# Patient Record
Sex: FEMALE | Race: WHITE | Employment: FULL TIME | URBAN - METROPOLITAN AREA
[De-identification: names, ages, dates, MRNs, and addresses within clinical notes are randomized per-mention and may not be internally consistent; named-entity substitution may affect disease eponyms.]

---

## 2022-03-11 ENCOUNTER — OFFICE VISIT (OUTPATIENT)
Dept: FAMILY MEDICINE CLINIC | Age: 22
End: 2022-03-11
Payer: COMMERCIAL

## 2022-03-11 VITALS
HEIGHT: 67 IN | SYSTOLIC BLOOD PRESSURE: 112 MMHG | OXYGEN SATURATION: 98 % | BODY MASS INDEX: 45.04 KG/M2 | DIASTOLIC BLOOD PRESSURE: 70 MMHG | HEART RATE: 84 BPM | WEIGHT: 287 LBS | TEMPERATURE: 97.8 F

## 2022-03-11 DIAGNOSIS — Z76.89 ENCOUNTER TO ESTABLISH CARE: ICD-10-CM

## 2022-03-11 DIAGNOSIS — F41.9 ANXIETY: ICD-10-CM

## 2022-03-11 DIAGNOSIS — Z76.89 ENCOUNTER TO ESTABLISH CARE: Primary | ICD-10-CM

## 2022-03-11 DIAGNOSIS — K76.0 FATTY LIVER: ICD-10-CM

## 2022-03-11 DIAGNOSIS — J45.20 MILD INTERMITTENT ASTHMA WITHOUT COMPLICATION: ICD-10-CM

## 2022-03-11 LAB
A/G RATIO: 2 (ref 1.1–2.2)
ALBUMIN SERPL-MCNC: 4.8 G/DL (ref 3.4–5)
ALP BLD-CCNC: 58 U/L (ref 40–129)
ALT SERPL-CCNC: 51 U/L (ref 10–40)
ANION GAP SERPL CALCULATED.3IONS-SCNC: 22 MMOL/L (ref 3–16)
AST SERPL-CCNC: 56 U/L (ref 15–37)
BILIRUB SERPL-MCNC: 0.5 MG/DL (ref 0–1)
BUN BLDV-MCNC: 8 MG/DL (ref 7–20)
CALCIUM SERPL-MCNC: 10 MG/DL (ref 8.3–10.6)
CHLORIDE BLD-SCNC: 102 MMOL/L (ref 99–110)
CHOLESTEROL, TOTAL: 188 MG/DL (ref 0–199)
CO2: 18 MMOL/L (ref 21–32)
CREAT SERPL-MCNC: 0.8 MG/DL (ref 0.6–1.1)
GFR AFRICAN AMERICAN: >60
GFR NON-AFRICAN AMERICAN: >60
GLUCOSE BLD-MCNC: 87 MG/DL (ref 70–99)
HCT VFR BLD CALC: 39.3 % (ref 36–48)
HDLC SERPL-MCNC: 29 MG/DL (ref 40–60)
HEMOGLOBIN: 13.5 G/DL (ref 12–16)
LDL CHOLESTEROL CALCULATED: 140 MG/DL
MCH RBC QN AUTO: 30 PG (ref 26–34)
MCHC RBC AUTO-ENTMCNC: 34.4 G/DL (ref 31–36)
MCV RBC AUTO: 87 FL (ref 80–100)
PDW BLD-RTO: 13.1 % (ref 12.4–15.4)
PLATELET # BLD: 290 K/UL (ref 135–450)
PMV BLD AUTO: 9.4 FL (ref 5–10.5)
POTASSIUM SERPL-SCNC: 4.1 MMOL/L (ref 3.5–5.1)
RBC # BLD: 4.52 M/UL (ref 4–5.2)
SODIUM BLD-SCNC: 142 MMOL/L (ref 136–145)
TOTAL PROTEIN: 7.2 G/DL (ref 6.4–8.2)
TRIGL SERPL-MCNC: 97 MG/DL (ref 0–150)
VLDLC SERPL CALC-MCNC: 19 MG/DL
WBC # BLD: 4.5 K/UL (ref 4–11)

## 2022-03-11 PROCEDURE — 99204 OFFICE O/P NEW MOD 45 MIN: CPT | Performed by: PHYSICIAN ASSISTANT

## 2022-03-11 RX ORDER — HYDROXYZINE HYDROCHLORIDE 25 MG/1
25 TABLET, FILM COATED ORAL 3 TIMES DAILY PRN
Qty: 90 TABLET | Refills: 0 | Status: SHIPPED | OUTPATIENT
Start: 2022-03-11 | End: 2022-04-10

## 2022-03-11 RX ORDER — ALBUTEROL SULFATE 90 UG/1
2 AEROSOL, METERED RESPIRATORY (INHALATION) 4 TIMES DAILY PRN
Qty: 18 G | Refills: 5 | Status: SHIPPED | OUTPATIENT
Start: 2022-03-11

## 2022-03-11 RX ORDER — FLUOXETINE 10 MG/1
10 CAPSULE ORAL DAILY
Qty: 30 CAPSULE | Refills: 3 | Status: SHIPPED | OUTPATIENT
Start: 2022-03-11

## 2022-03-11 SDOH — ECONOMIC STABILITY: HOUSING INSECURITY
IN THE LAST 12 MONTHS, WAS THERE A TIME WHEN YOU DID NOT HAVE A STEADY PLACE TO SLEEP OR SLEPT IN A SHELTER (INCLUDING NOW)?: NO

## 2022-03-11 SDOH — ECONOMIC STABILITY: HOUSING INSECURITY: IN THE LAST 12 MONTHS, HOW MANY PLACES HAVE YOU LIVED?: 1

## 2022-03-11 SDOH — ECONOMIC STABILITY: TRANSPORTATION INSECURITY
IN THE PAST 12 MONTHS, HAS LACK OF TRANSPORTATION KEPT YOU FROM MEETINGS, WORK, OR FROM GETTING THINGS NEEDED FOR DAILY LIVING?: NO

## 2022-03-11 SDOH — ECONOMIC STABILITY: FOOD INSECURITY: WITHIN THE PAST 12 MONTHS, THE FOOD YOU BOUGHT JUST DIDN'T LAST AND YOU DIDN'T HAVE MONEY TO GET MORE.: NEVER TRUE

## 2022-03-11 SDOH — ECONOMIC STABILITY: INCOME INSECURITY: IN THE LAST 12 MONTHS, WAS THERE A TIME WHEN YOU WERE NOT ABLE TO PAY THE MORTGAGE OR RENT ON TIME?: NO

## 2022-03-11 SDOH — ECONOMIC STABILITY: TRANSPORTATION INSECURITY
IN THE PAST 12 MONTHS, HAS THE LACK OF TRANSPORTATION KEPT YOU FROM MEDICAL APPOINTMENTS OR FROM GETTING MEDICATIONS?: NO

## 2022-03-11 SDOH — ECONOMIC STABILITY: FOOD INSECURITY: WITHIN THE PAST 12 MONTHS, YOU WORRIED THAT YOUR FOOD WOULD RUN OUT BEFORE YOU GOT MONEY TO BUY MORE.: NEVER TRUE

## 2022-03-11 ASSESSMENT — ENCOUNTER SYMPTOMS
ABDOMINAL PAIN: 0
SHORTNESS OF BREATH: 0
RHINORRHEA: 0
CONSTIPATION: 0
DIARRHEA: 0
NAUSEA: 0
SORE THROAT: 0
VOMITING: 0
COUGH: 0

## 2022-03-11 ASSESSMENT — PATIENT HEALTH QUESTIONNAIRE - PHQ9
SUM OF ALL RESPONSES TO PHQ QUESTIONS 1-9: 0
SUM OF ALL RESPONSES TO PHQ QUESTIONS 1-9: 0
1. LITTLE INTEREST OR PLEASURE IN DOING THINGS: 0
SUM OF ALL RESPONSES TO PHQ QUESTIONS 1-9: 0
SUM OF ALL RESPONSES TO PHQ QUESTIONS 1-9: 0
SUM OF ALL RESPONSES TO PHQ9 QUESTIONS 1 & 2: 0
2. FEELING DOWN, DEPRESSED OR HOPELESS: 0

## 2022-03-11 ASSESSMENT — SOCIAL DETERMINANTS OF HEALTH (SDOH): HOW HARD IS IT FOR YOU TO PAY FOR THE VERY BASICS LIKE FOOD, HOUSING, MEDICAL CARE, AND HEATING?: NOT HARD AT ALL

## 2022-03-11 NOTE — PROGRESS NOTES
3/11/2022  Emely Harvey (: 2000)  25 y.o. HPI     Patient presents to establish care. Has not had PCP since pediatrician. History of anxiety/depression/bipolar disorder. Last on medication in 2018. Has been on lexapro, zoloft, prozac in the past. Prozac worked well, also on lithium and atarax at that time. Has not been on anything for the past few years. Feels she has been coping decently, work is going well, has a good support system with family and friends. Sleeping ok, normal appetite. Does have increased anxiety, usually daily. History of fatty liver disease, per chart review diagnosed at 5 yo. Does have family history of fatty liver disease on dad's side. Persistently elevated liver enzymes. None recently. Denies abdominal pain, diarrhea, constipation. MRI liver 2018  Impression  Performed by [facility]  1.  No anatomic abnormalities in the liver or the remainder of the imaged abdomen. 2.  Normal mean liver stiffness of 2.5 kPa (range 2.4 to 2.7 kPa). 3.  Elevated hepatic fat fraction of 35.8% determined by mDixon technique (NOTE: comparisons of fat   fractions determined by different techniques may not be accurate). 4.  Normal quantitative estimate of liver iron content 1084 mcg/gram dry weight. 5.  Liver volume = 2838 mL. History of asthma as a child. Tends to flare with exercise or when she is sick. Uses albuterol inhalers prn. Exercising a few times a week. No particular diet   Works for [employer], enjoys her job     Not currently sexually active, has been sexually active in the past. Has not had pap. Review of Systems   Constitutional: Negative for activity change, chills and fever. HENT: Negative for congestion, ear pain, rhinorrhea and sore throat. Eyes: Negative for visual disturbance. Respiratory: Negative for cough and shortness of breath. Cardiovascular: Negative for chest pain and palpitations.    Gastrointestinal: Negative for abdominal pain, constipation, diarrhea, nausea and vomiting. Genitourinary: Negative for difficulty urinating and dysuria. Musculoskeletal: Negative for arthralgias and myalgias. Skin: Negative for rash. Neurological: Negative for dizziness, weakness and numbness. Psychiatric/Behavioral: Positive for dysphoric mood. Negative for sleep disturbance. The patient is nervous/anxious. Past Medical History:   Diagnosis Date    Anxiety     Bipolar disorder ([address]     Depression     Prediabetes      History reviewed. No pertinent surgical history. Family History   Problem Relation Age of Onset    Breast Cancer Mother      Social History     Socioeconomic History    Marital status: Single     Spouse name: Not on file    Number of children: Not on file    Years of education: Not on file    Highest education level: Not on file   Occupational History    Not on file   Tobacco Use    Smoking status: Never Smoker    Smokeless tobacco: Never Used   Vaping Use    Vaping Use: Every day   Substance and Sexual Activity    Alcohol use: Never    Drug use: Never    Sexual activity: Not on file   Other Topics Concern    Not on file   Social History Narrative    Not on file     Social Determinants of Health     Financial Resource Strain: Low Risk     Difficulty of Paying Living Expenses: Not hard at all   Food Insecurity: No Food Insecurity    Worried About Running Out of Food in the Last Year: Never true    [address] in the Last Year: Never true   Transportation Needs: No Transportation Needs    Lack of Transportation (Medical): No    Lack of Transportation (Non-Medical):  No   Physical Activity:     Days of Exercise per Week: Not on file    Minutes of Exercise per Session: Not on file   Stress:     Feeling of Stress : Not on file   Social Connections:     Frequency of Communication with Friends and Family: Not on file    Frequency of Social Gatherings with Friends and Family: Not on file    Attends Cheondoism Services: Not on file    Active Member of Clubs or Organizations: Not on file    Attends Club or Organization Meetings: Not on file    Marital Status: Not on file   Intimate Partner Violence:     Fear of Current or Ex-Partner: Not on file    Emotionally Abused: Not on file    Physically Abused: Not on file    Sexually Abused: Not on file   Housing Stability: [address] Unable to Pay for Housing in the Last Year: No    Number of [place] in the Last Year: 1    Unstable Housing in the Last Year: No     No Known Allergies    Current Outpatient Medications   Medication Sig Dispense Refill    albuterol sulfate HFA (VENTOLIN HFA) 108 (90 Base) MCG/ACT inhaler Inhale 2 puffs into the lungs 4 times daily as needed for Wheezing 18 g 5    FLUoxetine (PROZAC) 10 MG capsule Take 1 capsule by mouth daily 30 capsule 3    hydrOXYzine (ATARAX) 25 MG tablet Take 1 tablet by mouth 3 times daily as needed for Anxiety 90 tablet 0     No current facility-administered medications for this visit. Vitals:    03/11/22 0914   BP: 112/70   Site: Right Upper Arm   Position: Sitting   Cuff Size: Large Adult   Pulse: 84   Temp: 97.8 °F (36.6 °C)   TempSrc: Oral   SpO2: 98%   Weight: 287 lb (130.2 kg)   Height: 5' 7\" (1.702 m)     Estimated body mass index is 44.95 kg/m² as calculated from the following:    Height as of this encounter: 5' 7\" (1.702 m). Weight as of this encounter: 287 lb (130.2 kg). Physical Exam  Constitutional:       General: She is not in acute distress. Appearance: She is well-developed. She is obese. HENT:      Head: Normocephalic and atraumatic. Eyes:      Conjunctiva/sclera: Conjunctivae normal.      Pupils: Pupils are equal, round, and reactive to light. Cardiovascular:      Rate and Rhythm: Normal rate and regular rhythm. Heart sounds: Normal heart sounds. No murmur heard.       Pulmonary:      Effort: Pulmonary effort is normal.      Breath sounds: Normal breath sounds. No wheezing. Abdominal:      General: Bowel sounds are normal.      Palpations: Abdomen is soft. Tenderness: There is no abdominal tenderness. Musculoskeletal:      Cervical back: Neck supple. Comments:     Lymphadenopathy:      Cervical: No cervical adenopathy. Skin:     General: Skin is warm and dry. Findings: No rash. Neurological:      Mental Status: She is alert and oriented to person, place, and time. ASSESSMENT and PLAN:  Anthony Bynum was seen today for new patient. Diagnoses and all orders for this visit:    Encounter to establish care  -     CBC; Future  -     Comprehensive Metabolic Panel; Future  -     LIPID PANEL; Future  -     Hemoglobin A1C; Future  - update labs today  - patient to return for pap and std screen    Fatty liver  -     US LIVER SPLEEN; Future  - update labs today     Mild intermittent asthma without complication  -     albuterol sulfate HFA (VENTOLIN HFA) 108 (90 Base) MCG/ACT inhaler; Inhale 2 puffs into the lungs 4 times daily as needed for Wheezing    Anxiety  -     FLUoxetine (PROZAC) 10 MG capsule; Take 1 capsule by mouth daily  -     hydrOXYzine (ATARAX) 25 MG tablet; Take 1 tablet by mouth 3 times daily as needed for Anxiety  - start prozac, low dose given h/o liver disease. Encouraged to monitor for sxs of rohan.   - I've explained to her that drugs of the SSRI class can have side effects such as weight gain, sexual dysfunction, insomnia, headache, nausea. These medications are generally effective at alleviating symptoms of anxiety and/or depression. Let me know if significant side effects do occur. Return in about 6 weeks (around 4/22/2022) for Anxiety.

## 2022-03-12 LAB
ESTIMATED AVERAGE GLUCOSE: 88.2 MG/DL
HBA1C MFR BLD: 4.7 %

## 2022-03-28 ENCOUNTER — HOSPITAL ENCOUNTER (OUTPATIENT)
Dept: ULTRASOUND IMAGING | Age: 22
Discharge: HOME | End: 2022-03-28
Payer: COMMERCIAL

## 2022-03-28 DIAGNOSIS — K76.0 FATTY LIVER: ICD-10-CM

## 2022-03-28 PROCEDURE — 76705 ECHO EXAM OF ABDOMEN: CPT

## 2022-04-05 DIAGNOSIS — F41.9 ANXIETY: ICD-10-CM

## 2022-04-05 RX ORDER — HYDROXYZINE HYDROCHLORIDE 25 MG/1
TABLET, FILM COATED ORAL
Qty: 90 TABLET | Refills: 0 | OUTPATIENT
Start: 2022-04-05

## 2022-04-05 RX ORDER — FLUOXETINE 10 MG/1
10 CAPSULE ORAL DAILY
Qty: 30 CAPSULE | Refills: 3 | OUTPATIENT
Start: 2022-04-05

## 2022-04-05 NOTE — TELEPHONE ENCOUNTER
Refill Request     Last Seen: Last Seen Department: 3/11/2022  Last Seen by PCP: 3/11/2022    Last Written: 3/11/2022    Next Appointment:   Future Appointments   Date Time Provider Pavan Espino   4/22/2022 10:30 AM T[facility] [place] - [facility]       Future appointment scheduled      Requested Prescriptions     Pending Prescriptions Disp Refills    hydrOXYzine (ATARAX) 25 MG tablet [Pharmacy Med Name: HYDROXYZINE HCL 25 MG TABLET] 90 tablet 0     Sig: TAKE 1 TABLET BY MOUTH THREE TIMES A DAY AS NEEDED FOR ANXIETY

## 2022-04-23 ENCOUNTER — APPOINTMENT (OUTPATIENT)
Dept: CT IMAGING | Age: 22
End: 2022-04-23
Payer: COMMERCIAL

## 2022-04-23 ENCOUNTER — HOSPITAL ENCOUNTER (EMERGENCY)
Age: 22
Discharge: HOME | End: 2022-04-23
Attending: STUDENT IN AN ORGANIZED HEALTH CARE EDUCATION/TRAINING PROGRAM
Payer: COMMERCIAL

## 2022-04-23 VITALS
HEART RATE: 72 BPM | DIASTOLIC BLOOD PRESSURE: 71 MMHG | BODY MASS INDEX: 46.12 KG/M2 | RESPIRATION RATE: 16 BRPM | HEIGHT: 66 IN | TEMPERATURE: 99 F | SYSTOLIC BLOOD PRESSURE: 114 MMHG | OXYGEN SATURATION: 100 % | WEIGHT: 287 LBS

## 2022-04-23 DIAGNOSIS — L05.01 PILONIDAL CYST WITH ABSCESS: Primary | ICD-10-CM

## 2022-04-23 LAB
A/G RATIO: 1.7 (ref 1.1–2.2)
ALBUMIN SERPL-MCNC: 4.6 G/DL (ref 3.4–5)
ALP BLD-CCNC: 67 U/L (ref 40–129)
ALT SERPL-CCNC: 17 U/L (ref 10–40)
AMORPHOUS: ABNORMAL /HPF
ANION GAP SERPL CALCULATED.3IONS-SCNC: 11 MMOL/L (ref 3–16)
AST SERPL-CCNC: 17 U/L (ref 15–37)
BACTERIA: ABNORMAL /HPF
BASOPHILS ABSOLUTE: 0 K/UL (ref 0–0.2)
BASOPHILS RELATIVE PERCENT: 0.5 %
BILIRUB SERPL-MCNC: 0.7 MG/DL (ref 0–1)
BILIRUBIN URINE: NEGATIVE
BLOOD, URINE: ABNORMAL
BUN BLDV-MCNC: 10 MG/DL (ref 7–20)
CALCIUM SERPL-MCNC: 9.4 MG/DL (ref 8.3–10.6)
CHLORIDE BLD-SCNC: 101 MMOL/L (ref 99–110)
CLARITY: CLEAR
CO2: 25 MMOL/L (ref 21–32)
COLOR: YELLOW
CREAT SERPL-MCNC: 1.1 MG/DL (ref 0.6–1.1)
EOSINOPHILS ABSOLUTE: 0.2 K/UL (ref 0–0.6)
EOSINOPHILS RELATIVE PERCENT: 2.9 %
EPITHELIAL CELLS, UA: ABNORMAL /HPF (ref 0–5)
GFR AFRICAN AMERICAN: >60
GFR NON-AFRICAN AMERICAN: >60
GLUCOSE BLD-MCNC: 90 MG/DL (ref 70–99)
GLUCOSE URINE: NEGATIVE MG/DL
HCG QUALITATIVE: NEGATIVE
HCT VFR BLD CALC: 36.4 % (ref 36–48)
HEMOGLOBIN: 12.6 G/DL (ref 12–16)
KETONES, URINE: NEGATIVE MG/DL
LACTIC ACID, SEPSIS: 0.9 MMOL/L (ref 0.4–1.9)
LEUKOCYTE ESTERASE, URINE: ABNORMAL
LYMPHOCYTES ABSOLUTE: 1.9 K/UL (ref 1–5.1)
LYMPHOCYTES RELATIVE PERCENT: 27.6 %
MCH RBC QN AUTO: 30.3 PG (ref 26–34)
MCHC RBC AUTO-ENTMCNC: 34.8 G/DL (ref 31–36)
MCV RBC AUTO: 87 FL (ref 80–100)
MICROSCOPIC EXAMINATION: YES
MONOCYTES ABSOLUTE: 0.6 K/UL (ref 0–1.3)
MONOCYTES RELATIVE PERCENT: 8.7 %
MUCUS: ABNORMAL /LPF
NEUTROPHILS ABSOLUTE: 4.1 K/UL (ref 1.7–7.7)
NEUTROPHILS RELATIVE PERCENT: 60.3 %
NITRITE, URINE: NEGATIVE
PDW BLD-RTO: 12.9 % (ref 12.4–15.4)
PH UA: 5.5 (ref 5–8)
PLATELET # BLD: 262 K/UL (ref 135–450)
PMV BLD AUTO: 8.7 FL (ref 5–10.5)
POTASSIUM REFLEX MAGNESIUM: 4 MMOL/L (ref 3.5–5.1)
PROTEIN UA: NEGATIVE MG/DL
RBC # BLD: 4.18 M/UL (ref 4–5.2)
RBC UA: ABNORMAL /HPF (ref 0–4)
RENAL EPITHELIAL, UA: ABNORMAL /HPF (ref 0–1)
SODIUM BLD-SCNC: 137 MMOL/L (ref 136–145)
SPECIFIC GRAVITY UA: 1.02 (ref 1–1.03)
TOTAL PROTEIN: 7.3 G/DL (ref 6.4–8.2)
URINE TYPE: ABNORMAL
UROBILINOGEN, URINE: 0.2 E.U./DL
WBC # BLD: 6.8 K/UL (ref 4–11)
WBC UA: ABNORMAL /HPF (ref 0–5)

## 2022-04-23 PROCEDURE — 85025 COMPLETE CBC W/AUTO DIFF WBC: CPT

## 2022-04-23 PROCEDURE — 99285 EMERGENCY DEPT VISIT HI MDM: CPT

## 2022-04-23 PROCEDURE — 96375 TX/PRO/DX INJ NEW DRUG ADDON: CPT

## 2022-04-23 PROCEDURE — 70450 CT HEAD/BRAIN W/O DYE: CPT

## 2022-04-23 PROCEDURE — 6360000004 HC RX CONTRAST MEDICATION: Performed by: STUDENT IN AN ORGANIZED HEALTH CARE EDUCATION/TRAINING PROGRAM

## 2022-04-23 PROCEDURE — 87040 BLOOD CULTURE FOR BACTERIA: CPT

## 2022-04-23 PROCEDURE — 6360000002 HC RX W HCPCS: Performed by: STUDENT IN AN ORGANIZED HEALTH CARE EDUCATION/TRAINING PROGRAM

## 2022-04-23 PROCEDURE — 80053 COMPREHEN METABOLIC PANEL: CPT

## 2022-04-23 PROCEDURE — 96374 THER/PROPH/DIAG INJ IV PUSH: CPT

## 2022-04-23 PROCEDURE — 72193 CT PELVIS W/DYE: CPT

## 2022-04-23 PROCEDURE — 84703 CHORIONIC GONADOTROPIN ASSAY: CPT

## 2022-04-23 PROCEDURE — 83605 ASSAY OF LACTIC ACID: CPT

## 2022-04-23 PROCEDURE — 2580000003 HC RX 258: Performed by: STUDENT IN AN ORGANIZED HEALTH CARE EDUCATION/TRAINING PROGRAM

## 2022-04-23 PROCEDURE — 81001 URINALYSIS AUTO W/SCOPE: CPT

## 2022-04-23 RX ORDER — METOCLOPRAMIDE HYDROCHLORIDE 5 MG/ML
10 INJECTION INTRAMUSCULAR; INTRAVENOUS ONCE
Status: COMPLETED | OUTPATIENT
Start: 2022-04-23 | End: 2022-04-23

## 2022-04-23 RX ORDER — DIPHENHYDRAMINE HYDROCHLORIDE 50 MG/ML
25 INJECTION INTRAMUSCULAR; INTRAVENOUS ONCE
Status: COMPLETED | OUTPATIENT
Start: 2022-04-23 | End: 2022-04-23

## 2022-04-23 RX ORDER — KETOROLAC TROMETHAMINE 30 MG/ML
15 INJECTION, SOLUTION INTRAMUSCULAR; INTRAVENOUS ONCE
Status: COMPLETED | OUTPATIENT
Start: 2022-04-23 | End: 2022-04-23

## 2022-04-23 RX ORDER — 0.9 % SODIUM CHLORIDE 0.9 %
1000 INTRAVENOUS SOLUTION INTRAVENOUS ONCE
Status: COMPLETED | OUTPATIENT
Start: 2022-04-23 | End: 2022-04-23

## 2022-04-23 RX ORDER — SULFAMETHOXAZOLE AND TRIMETHOPRIM 800; 160 MG/1; MG/1
1 TABLET ORAL 2 TIMES DAILY
COMMUNITY

## 2022-04-23 RX ADMIN — METOCLOPRAMIDE HYDROCHLORIDE 10 MG: 5 INJECTION INTRAMUSCULAR; INTRAVENOUS at 15:26

## 2022-04-23 RX ADMIN — SODIUM CHLORIDE 1000 ML: 9 INJECTION, SOLUTION INTRAVENOUS at 15:25

## 2022-04-23 RX ADMIN — DIPHENHYDRAMINE HYDROCHLORIDE 25 MG: 50 INJECTION, SOLUTION INTRAMUSCULAR; INTRAVENOUS at 15:27

## 2022-04-23 RX ADMIN — KETOROLAC TROMETHAMINE 15 MG: 30 INJECTION, SOLUTION INTRAMUSCULAR at 15:26

## 2022-04-23 RX ADMIN — IOPAMIDOL 75 ML: 755 INJECTION, SOLUTION INTRAVENOUS at 16:24

## 2022-04-23 ASSESSMENT — PAIN DESCRIPTION - DESCRIPTORS
DESCRIPTORS: ACHING
DESCRIPTORS: ACHING

## 2022-04-23 ASSESSMENT — PAIN DESCRIPTION - FREQUENCY: FREQUENCY: CONTINUOUS

## 2022-04-23 ASSESSMENT — PAIN DESCRIPTION - LOCATION
LOCATION: SACRUM
LOCATION: SACRUM

## 2022-04-23 ASSESSMENT — PAIN SCALES - GENERAL
PAINLEVEL_OUTOF10: 2
PAINLEVEL_OUTOF10: 9
PAINLEVEL_OUTOF10: 9
PAINLEVEL_OUTOF10: 0
PAINLEVEL_OUTOF10: 2

## 2022-04-23 ASSESSMENT — PAIN - FUNCTIONAL ASSESSMENT: PAIN_FUNCTIONAL_ASSESSMENT: 0-10

## 2022-04-23 NOTE — ED PROVIDER NOTES
[address] [facility]  ED      CHIEF COMPLAINT  Cyst (cyst on tailbone. Has been there for 3-4 weeks. Has seen PCP, had area drained and has been on antibiotics. Presents today, \"because no better. \")       HISTORY OF PRESENT ILLNESS  Nickolas Eisenberg is a 25 y.o. female  who presents to the ED complaining of recurrent pilonidal abscess. Patient states she first noticed symptoms 2 weeks ago. Went to urgent care 9 days ago and had it drained and started on Keflex. It did not improve and became larger so she went back to urgent care 2 days ago. At this time she states that they changed antibiotics to Bactrim, poked a hole in the abscess and sent a culture. She was advised to come here today because over the last week she has been having intermittent chills as well as headaches. Patient indicates the headache is primarily in the posterior aspect of her head, worse with movement. She does have a headache currently. She brings a paper report of the culture which is growing Corynebacterium, which is resistant. This is noted to be a common skin nelly. No other complaints, modifying factors or associated symptoms. I have reviewed the following from the nursing documentation. Past Medical History:   Diagnosis Date    Anxiety     Bipolar disorder ([facility])     Depression     Prediabetes      History reviewed. No pertinent surgical history.   Family History   Problem Relation Age of Onset    Breast Cancer Mother      Social History     Socioeconomic History    Marital status: Single     Spouse name: Not on file    Number of children: Not on file    Years of education: Not on file    Highest education level: Not on file   Occupational History    Not on file   Tobacco Use    Smoking status: Never Smoker    Smokeless tobacco: Never Used   Vaping Use    Vaping Use: Every day   Substance and Sexual Activity    Alcohol use: Never    Drug use: Never    Sexual activity: Not on file   Other Topics Concern    Not on file   Social History Narrative    Not on file     Social Determinants of Health     Financial Resource Strain: Low Risk     Difficulty of Paying Living Expenses: Not hard at all   Food Insecurity: No Food Insecurity    Worried About Running Out of Food in the Last Year: Never true    [address] in the Last Year: Never true   Transportation Needs: No Transportation Needs    Lack of Transportation (Medical): No    Lack of Transportation (Non-Medical):  No   Physical Activity:     Days of Exercise per Week: Not on file    Minutes of Exercise per Session: Not on file   Stress:     Feeling of Stress : Not on file   Social Connections:     Frequency of Communication with Friends and Family: Not on file    Frequency of Social Gatherings with Friends and Family: Not on file    Attends Adventism Services: Not on file    Active Member of [address] [place] [address] or Organizations: Not on file    Attends Club or Organization Meetings: Not on file    Marital Status: Not on file   Intimate Partner Violence:     Fear of Current or Ex-Partner: Not on file    Emotionally Abused: Not on file    Physically Abused: Not on file    Sexually Abused: Not on file   Housing Stability: [facility] [address] Unable to Pay for Housing in the Last Year: No    Number of [place] in the Last Year: 1    Unstable Housing in the Last Year: No     Current Facility-Administered Medications   Medication Dose Route Frequency Provider Last Rate Last Admin    0.9 % sodium chloride bolus  1,000 mL IntraVENous Once Waylan Mortimer, DO        metoclopramide (REGLAN) injection 10 mg  10 mg IntraVENous Once Waylan Mortimer, DO        diphenhydrAMINE (BENADRYL) injection 25 mg  25 mg IntraVENous Once Waylan Mortimer, DO        ketorolac (TORADOL) injection 15 mg  15 mg IntraVENous Once Waylan Mortimer, DO         Current Outpatient Medications   Medication Sig Dispense Refill    sulfamethoxazole-trimethoprim (BACTRIM DS;SEPTRA DS) 800-160 MG per tablet Take 1 tablet by mouth 2 times daily      albuterol sulfate HFA (VENTOLIN HFA) 108 (90 Base) MCG/ACT inhaler Inhale 2 puffs into the lungs 4 times daily as needed for Wheezing 18 g 5    FLUoxetine (PROZAC) 10 MG capsule Take 1 capsule by mouth daily (Patient not taking: Reported on 4/23/2022) 30 capsule 3     No Known Allergies    REVIEW OF SYSTEMS  10 systems reviewed, pertinent positives per HPI otherwise noted to be negative. PHYSICAL EXAM  BP (!) 117/98   Pulse 95   Temp 99 °F (37.2 °C) (Oral)   Resp 16   Ht 5' 6\" (1.676 m)   Wt 287 lb (130.2 kg)   LMP 04/11/2022 (Approximate)   SpO2 99%   BMI 46.32 kg/m²    General: Appears well. Alert  HEENT: Head atraumatic, Eyes normal inspection, PERRL. Normal ENT inspection, Pharynx normal. No signs of dehydration  NECK: Normal inspection, no meningismus  RESPIRATORY: Normal breath sounds. No chest wall tenderness. No respiratory distress  CVS: Heart rate and rhythm regular. No Murmurs  ABDOMEN/GI: Soft, Non-tender, No distention  BACK: Approximately 2 cm pilonidal abscess with surrounding induration and erythema. EXTREMITIES: Non-Tender. Full ROM. Normal appearance. No Pedal edema  NEURO: Alert and oriented. Sensation normal. Motor normal  PSYCH: Mood normal. Affect normal.  SKIN: Color normal. No rash. Warm, Dry    LABS  I have reviewed all labs for this visit.    Results for orders placed or performed during the hospital encounter of 04/23/22   CBC with Auto Differential   Result Value Ref Range    WBC 6.8 4.0 - 11.0 K/uL    RBC 4.18 4.00 - 5.20 M/uL    Hemoglobin 12.6 12.0 - 16.0 g/dL    Hematocrit 36.4 36.0 - 48.0 %    MCV 87.0 80.0 - 100.0 fL    MCH 30.3 26.0 - 34.0 pg    MCHC 34.8 31.0 - 36.0 g/dL    RDW 12.9 12.4 - 15.4 %    Platelets 813 666 - 885 K/uL    MPV 8.7 5.0 - 10.5 fL    Neutrophils % 60.3 %    Lymphocytes % 27.6 %    Monocytes % 8.7 %    Eosinophils % 2.9 %    Basophils % 0.5 %    Neutrophils Absolute 4.1 1.7 - 7.7 K/uL    Lymphocytes Absolute 1.9 1.0 - 5.1 K/uL    Monocytes Absolute 0.6 0.0 - 1.3 K/uL    Eosinophils Absolute 0.2 0.0 - 0.6 K/uL    Basophils Absolute 0.0 0.0 - 0.2 K/uL   Comprehensive Metabolic Panel w/ Reflex to MG   Result Value Ref Range    Sodium 137 136 - 145 mmol/L    Potassium reflex Magnesium 4.0 3.5 - 5.1 mmol/L    Chloride 101 99 - 110 mmol/L    CO2 25 21 - 32 mmol/L    Anion Gap 11 3 - 16    Glucose 90 70 - 99 mg/dL    BUN 10 7 - 20 mg/dL    CREATININE 1.1 0.6 - 1.1 mg/dL    GFR Non-African American >60 >60    GFR African American >60 >60    Calcium 9.4 8.3 - 10.6 mg/dL    Total Protein 7.3 6.4 - 8.2 g/dL    Albumin 4.6 3.4 - 5.0 g/dL    Albumin/Globulin Ratio 1.7 1.1 - 2.2    Total Bilirubin 0.7 0.0 - 1.0 mg/dL    Alkaline Phosphatase 67 40 - 129 U/L    ALT 17 10 - 40 U/L    AST 17 15 - 37 U/L   Lactate, Sepsis   Result Value Ref Range    Lactic Acid, Sepsis 0.9 0.4 - 1.9 mmol/L   Urinalysis   Result Value Ref Range    Color, UA Yellow Straw/Yellow    Clarity, UA Clear Clear    Glucose, Ur Negative Negative mg/dL    Bilirubin Urine Negative Negative    Ketones, Urine Negative Negative mg/dL    Specific Gravity, UA 1.025 1.005 - 1.030    Blood, Urine SMALL (A) Negative    pH, UA 5.5 5.0 - 8.0    Protein, UA Negative Negative mg/dL    Urobilinogen, Urine 0.2 <2.0 E.U./dL    Nitrite, Urine Negative Negative    Leukocyte Esterase, Urine MODERATE (A) Negative    Microscopic Examination YES     Urine Type NotGiven    HCG Qualitative, Serum   Result Value Ref Range    hCG Qual Negative Detects HCG level >10 MIU/mL   Microscopic Urinalysis   Result Value Ref Range    Mucus, UA 1+ (A) None Seen /LPF    WBC, UA 21-50 (A) 0 - 5 /HPF    RBC, UA 3-4 0 - 4 /HPF    Epithelial Cells, UA 11-20 (A) 0 - 5 /HPF    Renal Epithelial, UA 2-5 (A) 0 - 1 /HPF    Bacteria, UA 2+ (A) None Seen /HPF    Amorphous, UA 1+ /HPF     RADIOLOGY  CT Head WO Contrast   Final Result   No acute intracranial abnormality.          CT PELVIS W CONTRAST Additional Contrast? None   Final Result   Infiltration of the subcutaneous fat and soft tissue swelling is seen   posterior to the coccyx but no evidence for an abscess is visualized             ED COURSE/MDM  Patient seen and evaluated. Old records reviewed. Labs and imaging reviewed and results discussed with patient. Due to recurrent nature along with systemic symptoms of subjective fevers and chills as well as headaches, will obtain a septic work-up for further evaluation. Patient and father specifically concerned for sepsis, as her mother was septic a few years ago. We will also treat with headache cocktail to symptomatically relief headache. We will also obtain CT head and CT pelvis to evaluate for headache and for the depth and possible extension of this recurrent pilonidal abscess. On reevaluation headache is resolved. CT shows no obvious abscess, but does have soft tissue swelling. I evaluated the pilonidal with ultrasound and see a complex structure in the area that I was palpating earlier. I do not see any discrete fluid collection that appears amenable to drainage. I discussed this in depth with the patient and her father. They are agreeable with continuing her current antibiotic of Septra and Tylenol and ibuprofen for pain. She can return precautions for fevers, nausea and vomiting, other concerning symptoms. She is given her PCP and general surgery to follow-up with in 2 to 3 days. During the patient's ED course, the patient was given:  Medications   0.9 % sodium chloride bolus (0 mLs IntraVENous Stopped 4/23/22 1602)   metoclopramide (REGLAN) injection 10 mg (10 mg IntraVENous Given 4/23/22 1526)   diphenhydrAMINE (BENADRYL) injection 25 mg (25 mg IntraVENous Given 4/23/22 1527)   ketorolac (TORADOL) injection 15 mg (15 mg IntraVENous Given 4/23/22 1526)   iopamidol (ISOVUE-370) 76 % injection 75 mL (75 mLs IntraVENous Given 4/23/22 1624)        CLINICAL IMPRESSION  1.  Pilonidal cyst with abscess        Blood pressure 108/64, pulse 79, temperature 99 °F (37.2 °C), temperature source Oral, resp. rate 16, height 5' 6\" (1.676 m), weight 287 lb (130.2 kg), last menstrual period 04/11/2022, SpO2 99 %. DISPOSITION  Barbara  was discharged to home in stable condition. Patient was given scripts for the following medications. I counseled patient how to take these medications. New Prescriptions    No medications on file       Follow-up with:  Ander Cruz, [address] [facility] [place] [address] [facility] [address]  [phone]    Call in 2 days      Sindi Pena, [address] [facility]  [phone]    Call in 1 week        DISCLAIMER: This chart was created using Dragon dictation software. Efforts were made by me to ensure accuracy, however some errors may be present due to limitations of this technology and occasionally words are not transcribed correctly.        Mejia Rodriguez DO  04/23/22 [id number]

## 2022-04-23 NOTE — Clinical Note
Eugenia Diehl was seen and treated in our emergency department on 4/23/2022. She may return to work on 04/24/2022. If you have any questions or concerns, please don't hesitate to call.       Liudmila Salgado, DO

## 2022-04-27 LAB
BLOOD CULTURE, ROUTINE: NORMAL
CULTURE, BLOOD 2: NORMAL

## 2022-04-29 ENCOUNTER — TELEMEDICINE (OUTPATIENT)
Dept: FAMILY MEDICINE CLINIC | Age: 22
End: 2022-04-29
Payer: COMMERCIAL

## 2022-04-29 DIAGNOSIS — L02.91 ABSCESS: Primary | ICD-10-CM

## 2022-04-29 PROCEDURE — 99214 OFFICE O/P EST MOD 30 MIN: CPT | Performed by: INTERNAL MEDICINE

## 2022-04-29 RX ORDER — HYDROCODONE BITARTRATE AND ACETAMINOPHEN 5; 325 MG/1; MG/1
1 TABLET ORAL EVERY 6 HOURS PRN
Qty: 18 TABLET | Refills: 0 | Status: SHIPPED | OUTPATIENT
Start: 2022-04-29 | End: 2022-05-02

## 2022-04-29 NOTE — PROGRESS NOTES
Adrian Cooney (:  2000) is a Established patient, here for evaluation of the following:    Assessment & Plan   Below is the assessment and plan developed based on review of pertinent history, physical exam, labs, studies, and medications. 1. Abscess  -     HYDROcodone-acetaminophen (NORCO) 5-325 MG per tablet; Take 1 tablet by mouth every 6 hours as needed for Pain for up to 3 days. Intended supply: 3 days. Take lowest dose possible to manage pain, Disp-18 tablet, R-0Normal  follow up with surgery as planned and stay on antibiotics. No follow-ups on file. Subjective   HPI   Pain in tailbone, has had an infection on antibiotics. Ongoing headache and not feeling well. Ongoing one month. Appointment with surgeon in a few days. Review of Systems   Constitutional: Negative for fever. Respiratory: Negative for shortness of breath. Genitourinary: Negative for pelvic pain and vaginal pain.           Objective   Patient-Reported Vitals  Patient-Reported Weight: 280  Patient-Reported Height: 5 6       Physical Exam  [INSTRUCTIONS:  \"[x]\" Indicates a positive item  \"[]\" Indicates a negative item  -- DELETE ALL ITEMS NOT EXAMINED]    Constitutional: [x] Appears well-developed and well-nourished [x] No apparent distress      [] Abnormal -     Mental status: [x] Alert and awake  [x] Oriented to person/place/time [x] Able to follow commands    [] Abnormal -     Eyes:   EOM    [x]  Normal    [] Abnormal -   Sclera  [x]  Normal    [] Abnormal -          Discharge [x]  None visible   [] Abnormal -     HENT: [x] Normocephalic, atraumatic  [] Abnormal -   [x] Mouth/Throat: Mucous membranes are moist    External Ears [x] Normal  [] Abnormal -    Neck: [x] No visualized mass [] Abnormal -     Pulmonary/Chest: [x] Respiratory effort normal   [x] No visualized signs of difficulty breathing or respiratory distress        [] Abnormal -      Musculoskeletal:   [x] Normal gait with no signs of ataxia         [x] Normal range of motion of neck        [] Abnormal -     Neurological:        [x] No Facial Asymmetry (Cranial nerve 7 motor function) (limited exam due to video visit)          [x] No gaze palsy        [] Abnormal -          Skin:        [x] No significant exanthematous lesions or discoloration noted on facial skin         [] Abnormal -            Psychiatric:       [x] Normal Affect [] Abnormal -        [x] No Hallucinations    Other pertinent observable physical exam findings:-                 Augusto Muniz, was evaluated through a synchronous (real-time) audio-video encounter. The patient (or guardian if applicable) is aware that this is a billable service, which includes applicable co-pays. This Virtual Visit was conducted with patient's (and/or legal guardian's) consent. The visit was conducted pursuant to the emergency declaration under the [address] [place] [address] authority and the [employer] General Act. Patient identification was verified, and a caregiver was present when appropriate. The patient was located at home in a state where the provider was licensed to provide care.        --Katia Walker MD

## 2022-05-02 ENCOUNTER — INITIAL CONSULT (OUTPATIENT)
Dept: SURGERY | Age: 22
End: 2022-05-02
Payer: COMMERCIAL

## 2022-05-02 VITALS
HEIGHT: 66 IN | DIASTOLIC BLOOD PRESSURE: 72 MMHG | WEIGHT: 266 LBS | BODY MASS INDEX: 42.75 KG/M2 | SYSTOLIC BLOOD PRESSURE: 126 MMHG

## 2022-05-02 DIAGNOSIS — L05.01 PILONIDAL ABSCESS: Primary | ICD-10-CM

## 2022-05-02 PROCEDURE — 99202 OFFICE O/P NEW SF 15 MIN: CPT | Performed by: SURGERY

## 2022-05-02 RX ORDER — MOXIFLOXACIN HYDROCHLORIDE 400 MG/1
TABLET ORAL
COMMUNITY
Start: 2022-04-24

## 2022-05-09 ASSESSMENT — ENCOUNTER SYMPTOMS: SHORTNESS OF BREATH: 0

## 2022-06-01 NOTE — PROGRESS NOTES
[facility]    HPI:  Patient is 25y.o. year old female seen at request of Vick Beckman. She reports recent pain and swelling at top of buttock crease. It opened and drained. She received antibiotics and it feels better. No previous similar symptoms. Past Medical History:   Diagnosis Date    Anxiety     Bipolar disorder ([address]     Depression     Prediabetes        History reviewed. No pertinent surgical history. Current Outpatient Medications on File Prior to Visit   Medication Sig Dispense Refill    moxifloxacin (AVELOX) 400 MG tablet TAKE 1 TABLET BY MOUTH EVERY DAY FOR 14 DAYS      sulfamethoxazole-trimethoprim (BACTRIM DS;SEPTRA DS) 800-160 MG per tablet Take 1 tablet by mouth 2 times daily      albuterol sulfate HFA (VENTOLIN HFA) 108 (90 Base) MCG/ACT inhaler Inhale 2 puffs into the lungs 4 times daily as needed for Wheezing 18 g 5    FLUoxetine (PROZAC) 10 MG capsule Take 1 capsule by mouth daily (Patient not taking: Reported on 5/2/2022) 30 capsule 3     No current facility-administered medications on file prior to visit.        No Known Allergies    Social History     Socioeconomic History    Marital status: Single     Spouse name: Not on file    Number of children: Not on file    Years of education: Not on file    Highest education level: Not on file   Occupational History    Not on file   Tobacco Use    Smoking status: Never Smoker    Smokeless tobacco: Never Used   Vaping Use    Vaping Use: Every day   Substance and Sexual Activity    Alcohol use: Never    Drug use: Never    Sexual activity: Not on file   Other Topics Concern    Not on file   Social History Narrative    Not on file     Social Determinants of Health     Financial Resource Strain: Low Risk     Difficulty of Paying Living Expenses: Not hard at all   Food Insecurity: No Food Insecurity    Worried About [address] in the Last Year: Never true    [address] in the Last Year: Never true   Transportation Needs: No Transportation Needs    Lack of Transportation (Medical): No    Lack of Transportation (Non-Medical): No   Physical Activity:     Days of Exercise per Week: Not on file    Minutes of Exercise per Session: Not on file   Stress:     Feeling of Stress : Not on file   Social Connections:     Frequency of Communication with Friends and Family: Not on file    Frequency of Social Gatherings with Friends and Family: Not on file    Attends Anabaptism Services: Not on file    Active Member of [address] [place] [address] [employer] or Organizations: Not on file    Attends Club or Organization Meetings: Not on file    Marital Status: Not on file   Intimate Partner Violence:     Fear of Current or Ex-Partner: Not on file    Emotionally Abused: Not on file    Physically Abused: Not on file    Sexually Abused: Not on file   Housing Stability: [address] Unable to Pay for Housing in the Last Year: No    Number of [place] in the Last Year: 1    Unstable Housing in the Last Year: No       Family History   Problem Relation Age of Onset    Breast Cancer Mother        ROS:  She reports no complaints related to the eyes, ears , nose throat or mouth. She denies weight loss. No chest pain. No SOB. No urinary complaints. No musculoskeletal complaints. No skin rashes. No neurologic deficits. No bleeding tendencies. GI complaints include none. Physical Exam:  Vitals:    05/02/22 1426   BP: 126/72   266#    General:  Comfortable. No distress. Eyes:  No scleral icterus  Ears:  Normal  Nose:  Normal  Mouth:  Mucous membranes moist  Respiratory: Lungs CTA. No accessory muscle use. Heart:  Regular rhythm  Abdomen:  Soft. Non distended. Non tender. Musculoskeletal:  No abnormal movements. ROM extremities normal.  Skin:  No rashes. Neurologic:  No focal deficits. Psychiatric:  AAA. O x 3. Superior buttock crease with minor remnant induration. No evident recurrent abscess.      Radiographic studies:  None    Laboratory Studies: None    ASSESSMENT:  Encounter Diagnosis   Name Primary?  Pilonidal abscess Yes           PLAN:  She has healed well and recovered well with treatment. I recommend observation and follow up as needed if symptoms recur. We discussed daily hygiene to the area as well.

## 2022-10-10 LAB — PAP SMEAR, EXTERNAL: NEGATIVE

## 2024-10-02 SDOH — ECONOMIC STABILITY: INCOME INSECURITY: HOW HARD IS IT FOR YOU TO PAY FOR THE VERY BASICS LIKE FOOD, HOUSING, MEDICAL CARE, AND HEATING?: NOT HARD AT ALL

## 2024-10-02 SDOH — ECONOMIC STABILITY: FOOD INSECURITY: WITHIN THE PAST 12 MONTHS, THE FOOD YOU BOUGHT JUST DIDN'T LAST AND YOU DIDN'T HAVE MONEY TO GET MORE.: NEVER TRUE

## 2024-10-02 SDOH — ECONOMIC STABILITY: FOOD INSECURITY: WITHIN THE PAST 12 MONTHS, YOU WORRIED THAT YOUR FOOD WOULD RUN OUT BEFORE YOU GOT MONEY TO BUY MORE.: NEVER TRUE

## 2024-10-02 ASSESSMENT — COLUMBIA-SUICIDE SEVERITY RATING SCALE - C-SSRS
4. IN THE PAST MONTH, HAVE YOU HAD THESE THOUGHTS AND HAD SOME INTENTION OF ACTING ON THEM?: NO
3. IN THE PAST MONTH, HAVE YOU BEEN THINKING ABOUT HOW YOU MIGHT KILL YOURSELF?: YES
7. DID THIS OCCUR IN THE LAST THREE MONTHS: NO
6. IN YOUR LIFETIME, HAVE YOU EVER DONE ANYTHING, STARTED TO DO ANYTHING, OR PREPARED TO DO ANYTHING TO END YOUR LIFE?: YES
5. IN THE PAST MONTH, HAVE YOU STARTED TO WORK OUT OR WORKED OUT THE DETAILS OF HOW TO KILL YOURSELF? DO YOU INTEND TO CARRY OUT THIS PLAN?: NO
1. IN THE PAST MONTH, HAVE YOU WISHED YOU WERE DEAD OR WISHED YOU COULD GO TO SLEEP AND NOT WAKE UP?: YES
2. IN THE PAST MONTH, HAVE YOU ACTUALLY HAD ANY THOUGHTS OF KILLING YOURSELF?: YES

## 2024-10-02 ASSESSMENT — PATIENT HEALTH QUESTIONNAIRE - PHQ9
5. POOR APPETITE OR OVEREATING: NEARLY EVERY DAY
9. THOUGHTS THAT YOU WOULD BE BETTER OFF DEAD, OR OF HURTING YOURSELF: SEVERAL DAYS
SUM OF ALL RESPONSES TO PHQ QUESTIONS 1-9: 18
10. IF YOU CHECKED OFF ANY PROBLEMS, HOW DIFFICULT HAVE THESE PROBLEMS MADE IT FOR YOU TO DO YOUR WORK, TAKE CARE OF THINGS AT HOME, OR GET ALONG WITH OTHER PEOPLE: VERY DIFFICULT
8. MOVING OR SPEAKING SO SLOWLY THAT OTHER PEOPLE COULD HAVE NOTICED. OR THE OPPOSITE - BEING SO FIDGETY OR RESTLESS THAT YOU HAVE BEEN MOVING AROUND A LOT MORE THAN USUAL: NOT AT ALL
2. FEELING DOWN, DEPRESSED OR HOPELESS: NEARLY EVERY DAY
1. LITTLE INTEREST OR PLEASURE IN DOING THINGS: NEARLY EVERY DAY
9. THOUGHTS THAT YOU WOULD BE BETTER OFF DEAD, OR OF HURTING YOURSELF: SEVERAL DAYS
8. MOVING OR SPEAKING SO SLOWLY THAT OTHER PEOPLE COULD HAVE NOTICED. OR THE OPPOSITE, BEING SO FIGETY OR RESTLESS THAT YOU HAVE BEEN MOVING AROUND A LOT MORE THAN USUAL: NOT AT ALL
2. FEELING DOWN, DEPRESSED OR HOPELESS: NEARLY EVERY DAY
1. LITTLE INTEREST OR PLEASURE IN DOING THINGS: NEARLY EVERY DAY
SUM OF ALL RESPONSES TO PHQ QUESTIONS 1-9: 18
7. TROUBLE CONCENTRATING ON THINGS, SUCH AS READING THE NEWSPAPER OR WATCHING TELEVISION: NEARLY EVERY DAY
3. TROUBLE FALLING OR STAYING ASLEEP: MORE THAN HALF THE DAYS
5. POOR APPETITE OR OVEREATING: NEARLY EVERY DAY
SUM OF ALL RESPONSES TO PHQ QUESTIONS 1-9: 18
SUM OF ALL RESPONSES TO PHQ9 QUESTIONS 1 & 2: 6
SUM OF ALL RESPONSES TO PHQ QUESTIONS 1-9: 17
7. TROUBLE CONCENTRATING ON THINGS, SUCH AS READING THE NEWSPAPER OR WATCHING TELEVISION: NEARLY EVERY DAY
3. TROUBLE FALLING OR STAYING ASLEEP: MORE THAN HALF THE DAYS
SUM OF ALL RESPONSES TO PHQ9 QUESTIONS 1 & 2: 6
6. FEELING BAD ABOUT YOURSELF - OR THAT YOU ARE A FAILURE OR HAVE LET YOURSELF OR YOUR FAMILY DOWN: MORE THAN HALF THE DAYS
SUM OF ALL RESPONSES TO PHQ QUESTIONS 1-9: 18
6. FEELING BAD ABOUT YOURSELF - OR THAT YOU ARE A FAILURE OR HAVE LET YOURSELF OR YOUR FAMILY DOWN: MORE THAN HALF THE DAYS
10. IF YOU CHECKED OFF ANY PROBLEMS, HOW DIFFICULT HAVE THESE PROBLEMS MADE IT FOR YOU TO DO YOUR WORK, TAKE CARE OF THINGS AT HOME, OR GET ALONG WITH OTHER PEOPLE: VERY DIFFICULT
4. FEELING TIRED OR HAVING LITTLE ENERGY: SEVERAL DAYS
4. FEELING TIRED OR HAVING LITTLE ENERGY: SEVERAL DAYS

## 2024-10-03 ENCOUNTER — HOSPITAL ENCOUNTER (OUTPATIENT)
Dept: GENERAL RADIOLOGY | Age: 24
Discharge: HOME | End: 2024-10-03
Payer: COMMERCIAL

## 2024-10-03 ENCOUNTER — OFFICE VISIT (OUTPATIENT)
Dept: FAMILY MEDICINE CLINIC | Age: 24
End: 2024-10-03
Payer: COMMERCIAL

## 2024-10-03 VITALS
HEART RATE: 70 BPM | OXYGEN SATURATION: 98 % | SYSTOLIC BLOOD PRESSURE: 114 MMHG | WEIGHT: 263.8 LBS | DIASTOLIC BLOOD PRESSURE: 68 MMHG | TEMPERATURE: 97.3 F | BODY MASS INDEX: 42.4 KG/M2 | HEIGHT: 66 IN

## 2024-10-03 DIAGNOSIS — R06.2 WHEEZING: ICD-10-CM

## 2024-10-03 DIAGNOSIS — F41.9 ANXIETY: Primary | ICD-10-CM

## 2024-10-03 DIAGNOSIS — F32.81 PMDD (PREMENSTRUAL DYSPHORIC DISORDER): ICD-10-CM

## 2024-10-03 DIAGNOSIS — J45.30 MILD PERSISTENT ASTHMA WITHOUT COMPLICATION: ICD-10-CM

## 2024-10-03 DIAGNOSIS — F50.89 BINGE-PURGE BEHAVIOR: ICD-10-CM

## 2024-10-03 DIAGNOSIS — F32.0 CURRENT MILD EPISODE OF MAJOR DEPRESSIVE DISORDER WITHOUT PRIOR EPISODE (HCC): ICD-10-CM

## 2024-10-03 PROCEDURE — 99215 OFFICE O/P EST HI 40 MIN: CPT | Performed by: PHYSICIAN ASSISTANT

## 2024-10-03 PROCEDURE — 71046 X-RAY EXAM CHEST 2 VIEWS: CPT

## 2024-10-03 RX ORDER — FLUTICASONE FUROATE AND VILANTEROL 100; 25 UG/1; UG/1
1 POWDER RESPIRATORY (INHALATION) DAILY
Qty: 28 EACH | Refills: 2 | Status: SHIPPED | OUTPATIENT
Start: 2024-10-03

## 2024-10-03 RX ORDER — FLUOXETINE 10 MG/1
10 CAPSULE ORAL DAILY
Qty: 30 CAPSULE | Refills: 3 | Status: SHIPPED | OUTPATIENT
Start: 2024-10-03

## 2024-10-03 ASSESSMENT — ENCOUNTER SYMPTOMS
VOMITING: 0
SORE THROAT: 0
SHORTNESS OF BREATH: 0
ABDOMINAL PAIN: 0
NAUSEA: 0
RHINORRHEA: 0
COUGH: 0
CONSTIPATION: 0
DIARRHEA: 0

## 2024-10-03 NOTE — PROGRESS NOTES
\"Have you been to the ER, urgent care clinic since your last visit?  Hospitalized since your last visit?\"    YES - When: approximately 2 months ago.  Where and Why: Urgent care was ill and having trouble breathing .    “Have you seen or consulted any other health care providers outside our system since your last visit?”    YES - When: approximately 12 months ago.  Where and Why: [facility] for back injury .     “Have you had a pap smear?”    NO    No cervical cancer screening on file              
nausea, palpitations or shortness of breath.         Presents for evaluation of anxiety and depression.   Last office visit 3/2022    Patient reports worsening anxiety and depression.   Worst 2 weeks before her period.   Difficulty with coping mechanisms-exercise  Increased social anxiety   Increased emotional lability.   Some si, denies plan.   States \"I could never do that\"   Has good support system with her family and boyfriend.     She does endorse a history of restricted eating as well as binge purge disorder.  She has previously been seen at the [facility] as well as eating recovery in her teens  States that she has been purging recently due to uncontrolled eating which she associates with her menses    History of PCOS   On OCP as a teenager  Menses are monthly now and fairly regular  She endorses 100 pound weight loss over the last couple years, with recent binging behaviors has regained about 40 pounds    Also with acute on chronic complaint of wheezing.  States diagnosed with asthma as a child.  Has always had rescue inhaler that she is use sparingly or with exercise.  Reports this spring was vaping and symptoms worsened.  Has since stopped vaping  Occasionally will have coughing spells  Utilizing albuterol inhaler at least once daily sometimes every 8 hours.  Endorses audible wheezing  Denies association with seasonal changes.    Review of Systems   Constitutional:  Positive for activity change, fatigue and unexpected weight change. Negative for chills and fever.   HENT:  Negative for congestion, ear pain, rhinorrhea and sore throat.    Eyes:  Negative for visual disturbance.   Respiratory:  Negative for cough and shortness of breath.    Cardiovascular:  Negative for chest pain and palpitations.   Gastrointestinal:  Negative for abdominal pain, constipation, diarrhea, nausea and vomiting.   Genitourinary:  Negative for difficulty urinating and dysuria.   Musculoskeletal:  Negative for arthralgias and

## 2024-10-27 DIAGNOSIS — F41.9 ANXIETY: ICD-10-CM

## 2024-10-28 RX ORDER — FLUOXETINE 10 MG/1
CAPSULE ORAL DAILY
Qty: 90 CAPSULE | Refills: 1 | Status: SHIPPED | OUTPATIENT
Start: 2024-10-28

## 2024-11-06 ENCOUNTER — OFFICE VISIT (OUTPATIENT)
Dept: FAMILY MEDICINE CLINIC | Age: 24
End: 2024-11-06
Payer: COMMERCIAL

## 2024-11-06 VITALS
SYSTOLIC BLOOD PRESSURE: 118 MMHG | OXYGEN SATURATION: 98 % | HEIGHT: 66 IN | BODY MASS INDEX: 43.17 KG/M2 | HEART RATE: 81 BPM | TEMPERATURE: 97.3 F | WEIGHT: 268.6 LBS | DIASTOLIC BLOOD PRESSURE: 66 MMHG

## 2024-11-06 DIAGNOSIS — F41.9 ANXIETY: Primary | ICD-10-CM

## 2024-11-06 DIAGNOSIS — F32.0 CURRENT MILD EPISODE OF MAJOR DEPRESSIVE DISORDER WITHOUT PRIOR EPISODE (HCC): ICD-10-CM

## 2024-11-06 DIAGNOSIS — Z86.59 HISTORY OF SUICIDAL IDEATION: ICD-10-CM

## 2024-11-06 DIAGNOSIS — L05.91 PILONIDAL CYST: ICD-10-CM

## 2024-11-06 PROCEDURE — 99214 OFFICE O/P EST MOD 30 MIN: CPT | Performed by: PHYSICIAN ASSISTANT

## 2024-11-06 RX ORDER — SULFAMETHOXAZOLE AND TRIMETHOPRIM 800; 160 MG/1; MG/1
1 TABLET ORAL 2 TIMES DAILY
Qty: 14 TABLET | Refills: 0 | Status: SHIPPED | OUTPATIENT
Start: 2024-11-06 | End: 2024-11-13

## 2024-11-06 RX ORDER — CEPHALEXIN 500 MG/1
500 CAPSULE ORAL 2 TIMES DAILY
Qty: 14 CAPSULE | Refills: 0 | Status: SHIPPED | OUTPATIENT
Start: 2024-11-06 | End: 2024-11-13

## 2024-11-06 SDOH — ECONOMIC STABILITY: FOOD INSECURITY: WITHIN THE PAST 12 MONTHS, THE FOOD YOU BOUGHT JUST DIDN'T LAST AND YOU DIDN'T HAVE MONEY TO GET MORE.: NEVER TRUE

## 2024-11-06 SDOH — ECONOMIC STABILITY: FOOD INSECURITY: WITHIN THE PAST 12 MONTHS, YOU WORRIED THAT YOUR FOOD WOULD RUN OUT BEFORE YOU GOT MONEY TO BUY MORE.: NEVER TRUE

## 2024-11-06 SDOH — ECONOMIC STABILITY: INCOME INSECURITY: HOW HARD IS IT FOR YOU TO PAY FOR THE VERY BASICS LIKE FOOD, HOUSING, MEDICAL CARE, AND HEATING?: NOT HARD AT ALL

## 2024-11-06 ASSESSMENT — PATIENT HEALTH QUESTIONNAIRE - PHQ9
SUM OF ALL RESPONSES TO PHQ QUESTIONS 1-9: 23
1. LITTLE INTEREST OR PLEASURE IN DOING THINGS: NEARLY EVERY DAY
SUM OF ALL RESPONSES TO PHQ QUESTIONS 1-9: 23
6. FEELING BAD ABOUT YOURSELF - OR THAT YOU ARE A FAILURE OR HAVE LET YOURSELF OR YOUR FAMILY DOWN: NEARLY EVERY DAY
3. TROUBLE FALLING OR STAYING ASLEEP: NEARLY EVERY DAY
4. FEELING TIRED OR HAVING LITTLE ENERGY: SEVERAL DAYS
SUM OF ALL RESPONSES TO PHQ9 QUESTIONS 1 & 2: 6
9. THOUGHTS THAT YOU WOULD BE BETTER OFF DEAD, OR OF HURTING YOURSELF: NEARLY EVERY DAY
SUM OF ALL RESPONSES TO PHQ QUESTIONS 1-9: 20
5. POOR APPETITE OR OVEREATING: NEARLY EVERY DAY
2. FEELING DOWN, DEPRESSED OR HOPELESS: NEARLY EVERY DAY
SUM OF ALL RESPONSES TO PHQ QUESTIONS 1-9: 23
7. TROUBLE CONCENTRATING ON THINGS, SUCH AS READING THE NEWSPAPER OR WATCHING TELEVISION: MORE THAN HALF THE DAYS
10. IF YOU CHECKED OFF ANY PROBLEMS, HOW DIFFICULT HAVE THESE PROBLEMS MADE IT FOR YOU TO DO YOUR WORK, TAKE CARE OF THINGS AT HOME, OR GET ALONG WITH OTHER PEOPLE: VERY DIFFICULT
8. MOVING OR SPEAKING SO SLOWLY THAT OTHER PEOPLE COULD HAVE NOTICED. OR THE OPPOSITE, BEING SO FIGETY OR RESTLESS THAT YOU HAVE BEEN MOVING AROUND A LOT MORE THAN USUAL: MORE THAN HALF THE DAYS

## 2024-11-06 ASSESSMENT — ENCOUNTER SYMPTOMS
COUGH: 0
SORE THROAT: 0
VOMITING: 0
NAUSEA: 0
ABDOMINAL PAIN: 0
CONSTIPATION: 0
RHINORRHEA: 0
SHORTNESS OF BREATH: 0
DIARRHEA: 0

## 2024-11-06 ASSESSMENT — COLUMBIA-SUICIDE SEVERITY RATING SCALE - C-SSRS
5. HAVE YOU STARTED TO WORK OUT OR WORKED OUT THE DETAILS OF HOW TO KILL YOURSELF? DO YOU INTEND TO CARRY OUT THIS PLAN?: NO
4. HAVE YOU HAD THESE THOUGHTS AND HAD SOME INTENTION OF ACTING ON THEM?: NO
7. DID THIS OCCUR IN THE LAST THREE MONTHS: NO
6. HAVE YOU EVER DONE ANYTHING, STARTED TO DO ANYTHING, OR PREPARED TO DO ANYTHING TO END YOUR LIFE?: YES
3. HAVE YOU BEEN THINKING ABOUT HOW YOU MIGHT KILL YOURSELF?: NO
2. HAVE YOU ACTUALLY HAD ANY THOUGHTS OF KILLING YOURSELF?: YES
1. WITHIN THE PAST MONTH, HAVE YOU WISHED YOU WERE DEAD OR WISHED YOU COULD GO TO SLEEP AND NOT WAKE UP?: YES

## 2024-11-06 NOTE — PROGRESS NOTES
\"Have you been to the ER, urgent care clinic since your last visit?  Hospitalized since your last visit?\"    NO    “Have you seen or consulted any other health care providers outside our system since your last visit?”    NO     “Have you had a pap smear?”    NO    No cervical cancer screening on file              
(VENTOLIN HFA) 108 (90 Base) MCG/ACT inhaler Inhale 2 puffs into the lungs 4 times daily as needed for Wheezing 18 g 5     No current facility-administered medications for this visit.       Vitals:    11/06/24 1005   BP: 118/66   Site: Right Upper Arm   Position: Sitting   Cuff Size: Large Adult   Pulse: 81   Temp: 97.3 °F (36.3 °C)   TempSrc: Temporal   SpO2: 98%   Weight: 121.8 kg (268 lb 9.6 oz)   Height: 1.676 m (5' 5.98\")     Estimated body mass index is 43.37 kg/m² as calculated from the following:    Height as of this encounter: 1.676 m (5' 5.98\").    Weight as of this encounter: 121.8 kg (268 lb 9.6 oz).    Physical Exam  Constitutional:       General: She is not in acute distress.     Appearance: She is well-developed.   HENT:      Head: Normocephalic and atraumatic.   Eyes:      Extraocular Movements: Extraocular movements intact.      Conjunctiva/sclera: Conjunctivae normal.      Pupils: Pupils are equal, round, and reactive to light.   Cardiovascular:      Rate and Rhythm: Normal rate and regular rhythm.      Heart sounds: Normal heart sounds. No murmur heard.  Pulmonary:      Effort: Pulmonary effort is normal.      Breath sounds: Normal breath sounds. No wheezing.   Abdominal:      General: Bowel sounds are normal.      Palpations: Abdomen is soft.      Tenderness: There is no abdominal tenderness.   Musculoskeletal:      Cervical back: Neck supple.   Lymphadenopathy:      Cervical: No cervical adenopathy.   Skin:     General: Skin is warm and dry.      Findings: No rash.             Comments: Golf ball sized area of induration to the left of the gluteal cleft, tender to palpation. No area of fluctuance, no head.    Neurological:      Mental Status: She is alert and oriented to person, place, and time.      Deep Tendon Reflexes: Reflexes are normal and symmetric.   Psychiatric:         Mood and Affect: Mood normal.         Behavior: Behavior normal.

## 2024-11-07 ENCOUNTER — PATIENT MESSAGE (OUTPATIENT)
Dept: FAMILY MEDICINE CLINIC | Age: 24
End: 2024-11-07

## 2024-11-07 DIAGNOSIS — L05.91 INFECTED PILONIDAL CYST: Primary | ICD-10-CM

## 2024-11-08 ENCOUNTER — TELEPHONE (OUTPATIENT)
Dept: FAMILY MEDICINE CLINIC | Age: 24
End: 2024-11-08

## 2024-11-08 ENCOUNTER — INITIAL CONSULT (OUTPATIENT)
Dept: SURGERY | Age: 24
End: 2024-11-08

## 2024-11-08 ENCOUNTER — TELEPHONE (OUTPATIENT)
Dept: SURGERY | Age: 24
End: 2024-11-08

## 2024-11-08 VITALS
HEIGHT: 66 IN | HEART RATE: 94 BPM | SYSTOLIC BLOOD PRESSURE: 136 MMHG | WEIGHT: 270.8 LBS | DIASTOLIC BLOOD PRESSURE: 67 MMHG | BODY MASS INDEX: 43.52 KG/M2

## 2024-11-08 DIAGNOSIS — L05.01 PILONIDAL ABSCESS OF NATAL CLEFT: Primary | ICD-10-CM

## 2024-11-08 DIAGNOSIS — L05.91 PILONIDAL CYST: Primary | ICD-10-CM

## 2024-11-08 RX ORDER — TRAMADOL HYDROCHLORIDE 50 MG/1
50 TABLET ORAL EVERY 6 HOURS PRN
Qty: 12 TABLET | Refills: 0 | Status: SHIPPED | OUTPATIENT
Start: 2024-11-08 | End: 2024-11-11

## 2024-11-08 NOTE — TELEPHONE ENCOUNTER
Loni Vargas from [facility] sent a chat message to me regarding this pt and also transferred her call to me. The pt has seen Dr. Webber back in 2022 and she saw her PCP last Wed for a pilonidal cyst that she has had in the past. Her PCP suggested she call us to see if she could get in today to possibly have it drained due to it being very painful. She was on antibiotics but it has progressively gotten worse. Please advise and thank you!

## 2024-11-08 NOTE — TELEPHONE ENCOUNTER
Responded in my chart message.   On day 2 of abx , no head on it at time of appointment.   Instructed to continue abx, and gen surg referral placed 2/2 to recurring problem- 6 in the last couple of years.

## 2024-11-08 NOTE — TELEPHONE ENCOUNTER
Patient called the office stating that the pilonidal cyst is becoming more painful and becoming larger. She has completed the round of antibiotics with minimal relief. Patient states that she is not able to sit or walk long distances due to increase pain.     She is willing to see general surgery for consultation.    Thanks.

## 2024-11-22 ENCOUNTER — OFFICE VISIT (OUTPATIENT)
Dept: SURGERY | Age: 24
End: 2024-11-22
Payer: COMMERCIAL

## 2024-11-22 VITALS
DIASTOLIC BLOOD PRESSURE: 74 MMHG | BODY MASS INDEX: 42.91 KG/M2 | HEIGHT: 66 IN | SYSTOLIC BLOOD PRESSURE: 128 MMHG | WEIGHT: 267 LBS

## 2024-11-22 DIAGNOSIS — L05.01 PILONIDAL ABSCESS OF NATAL CLEFT: Primary | ICD-10-CM

## 2024-11-22 PROCEDURE — 99214 OFFICE O/P EST MOD 30 MIN: CPT | Performed by: SURGERY

## 2024-11-22 NOTE — PROGRESS NOTES
Surgery Post-op Progress Note    HPI:  Notes reviewed, and agree with documentation in pt's chart.   Postoperative Follow-up: Patient presents for 2 week follow-up status post incision and drainage of large pilonidal abscess in office . The patient is not having any pain..     ROS:    10 point review of systems performed; please refer to HPI with pertinent positives, all other ROS are negative    A review of the patient's record including allergies, medication list, tobacco history, family history, problem list, medical history and social history has been completed and updates made to the patient's EMR where indicated.     PE:   CONSTITUTIONAL:  awake and alert    INCISION: yoli cleft - abscess site has completely resolved and the incision site healed; midline w/ stable sinus opening without any hairs protruding currently      ASSESSMENT:   Diagnosis Orders   1. Pilonidal abscess of  cleft            .  PLAN:    Will plan for excision of the yoli cleft/pilonidal cyst along with the chronic abscess area; anticipate using an eccentric/paramedian wound closure (ie Karydakis flap)   Technical aspects, risks and complications (wound infection, poor healing, and recurrent pilonida disease) reviewed/  Patient appears to understand, asks appropriate questions, and agrees to proceed.

## 2024-11-23 RX ORDER — SODIUM CHLORIDE 0.9 % (FLUSH) 0.9 %
5-40 SYRINGE (ML) INJECTION PRN
OUTPATIENT
Start: 2024-11-23

## 2024-11-23 RX ORDER — SODIUM CHLORIDE 9 MG/ML
INJECTION, SOLUTION INTRAVENOUS PRN
OUTPATIENT
Start: 2024-11-23

## 2024-11-23 RX ORDER — SODIUM CHLORIDE 0.9 % (FLUSH) 0.9 %
5-40 SYRINGE (ML) INJECTION EVERY 12 HOURS SCHEDULED
OUTPATIENT
Start: 2024-11-23

## 2024-11-23 NOTE — PATIENT INSTRUCTIONS
[facility]  Phone: [phone]  Fax: [phone]    You will be scheduled for surgery with Dr. Dale.   The office will call you with the date and time that surgery is scheduled.  Please take note of these instructions for surgery:  You should have nothing by mouth after midnight the night before your surgery - this includes no food or water.   Your surgery will be cancelled if you have taken anything by mouth after midnight, NO exceptions.   You will need to have a history and physical prior to your surgery. This will need to be completed up to 30 days before your surgery. This H/P can be completed by your family doctor or the hospital.   IF you take coumadin (warfarin), please stop taking this medication 5 days prior to your surgery.   IF you take plavix, please stop taking this 7 days prior to your surgery.   Please contact our office if you have a pacemaker or defibrillator.  IF you are allergic to latex, please tell our office prior to your surgery. This is important in know before scheduling your surgery.  IF you are having an out patient surgery, you will need someone available to drive you home after your surgery, and to also stay with you for the rest of the day.   IF you are having a surgery requiring an inpatient stay in the hospital, you will need someone to drive you home upon discharge from the hospital.  Please contact Dr. Dale's assistant Dorothy if you have any questions or concerns.  Please call the office with any changes in your symptoms or further questions/concerns. [phone]

## 2024-12-09 ENCOUNTER — TELEMEDICINE (OUTPATIENT)
Dept: PSYCHOLOGY | Age: 24
End: 2024-12-09
Payer: COMMERCIAL

## 2024-12-09 DIAGNOSIS — F43.10 PTSD (POST-TRAUMATIC STRESS DISORDER): Primary | ICD-10-CM

## 2024-12-09 DIAGNOSIS — F33.1 MDD (MAJOR DEPRESSIVE DISORDER), RECURRENT EPISODE, MODERATE (HCC): ICD-10-CM

## 2024-12-09 PROCEDURE — 90791 PSYCH DIAGNOSTIC EVALUATION: CPT | Performed by: PSYCHOLOGIST

## 2024-12-09 NOTE — PROGRESS NOTES
Risk  (11/6/2024)    Overall Financial Resource Strain (CARDIA)     Difficulty of Paying Living Expenses: Not hard at all   Food Insecurity: No Food Insecurity (11/6/2024)    Hunger Vital Sign     Worried About Running Out of Food in the Last Year: Never true     Ran Out of Food in the Last Year: Never true   Transportation Needs: Unknown (11/6/2024)    PRAPARE - Transportation     Lack of Transportation (Medical): Not on file     Lack of Transportation (Non-Medical): No   Physical Activity: Not on file   Stress: Not on file   Social Connections: Not on file   Intimate Partner Violence: Unknown (1/23/2024)    [facility]    Interpersonal Safety     Feel physically or emotionally unsafe where currently live: Not on file     Harm by anyone: Not on file     Emotionally Harmed: Not on file   Housing Stability: Unknown (11/6/2024)    Housing Stability Vital Sign     Unable to Pay for Housing in the Last Year: Not on file     Number of Times Moved in the Last Year: Not on file     Homeless in the Last Year: No     Family History:   Family History   Problem Relation Age of Onset    Breast Cancer Mother     Depression Maternal Grandmother     Alcohol Abuse Maternal Uncle        A:  PHQ-9 History. Denies SI/HI.       11/6/2024    10:08 AM 10/2/2024     1:57 PM 3/11/2022     9:15 AM   PHQ Scores   PHQ2 Score 6 6 0   PHQ9 Score 23 18 0   Interpretation of Total Score Depression Severity: 1-4 = Minimal depression, 5-9 = Mild depression, 10-14 = Moderate depression, 15-19 = Moderately severe depression, 20-27 = Severe depression    Diagnosis:      Plan:  Interventions  Established rapport, provided support and validation. Assessed history and recent symptoms for diagnostic clarification.  Collaboratively discussed recent stressors, provided support and validation.  Engaged in goal-setting and prioritizing of patient's primary goals for [facility] visit / overall health. Provided

## 2024-12-30 ENCOUNTER — PATIENT MESSAGE (OUTPATIENT)
Dept: FAMILY MEDICINE CLINIC | Age: 24
End: 2024-12-30

## 2024-12-31 ENCOUNTER — OFFICE VISIT (OUTPATIENT)
Dept: FAMILY MEDICINE CLINIC | Age: 24
End: 2024-12-31
Payer: COMMERCIAL

## 2024-12-31 VITALS
BODY MASS INDEX: 43.39 KG/M2 | DIASTOLIC BLOOD PRESSURE: 80 MMHG | OXYGEN SATURATION: 99 % | WEIGHT: 270 LBS | HEIGHT: 66 IN | HEART RATE: 79 BPM | SYSTOLIC BLOOD PRESSURE: 120 MMHG

## 2024-12-31 DIAGNOSIS — L50.9 URTICARIA: Primary | ICD-10-CM

## 2024-12-31 PROCEDURE — 99213 OFFICE O/P EST LOW 20 MIN: CPT | Performed by: STUDENT IN AN ORGANIZED HEALTH CARE EDUCATION/TRAINING PROGRAM

## 2024-12-31 NOTE — PROGRESS NOTES
of dog bite without surrounding erythema or tenderness       Vitals:    12/31/24 1312   BP: 120/80   Pulse: 79   SpO2: 99%       Please note that portions of this note may have been completed with a voice recognition program. Efforts were made to edit the dictations but occasionally words are mis-transcribed.

## 2025-01-06 ENCOUNTER — TELEMEDICINE (OUTPATIENT)
Dept: PSYCHOLOGY | Age: 25
End: 2025-01-06
Payer: COMMERCIAL

## 2025-01-06 DIAGNOSIS — F50.22 BULIMIA NERVOSA, MODERATE: ICD-10-CM

## 2025-01-06 DIAGNOSIS — F43.10 PTSD (POST-TRAUMATIC STRESS DISORDER): ICD-10-CM

## 2025-01-06 DIAGNOSIS — F33.1 MDD (MAJOR DEPRESSIVE DISORDER), RECURRENT EPISODE, MODERATE (HCC): Primary | ICD-10-CM

## 2025-01-06 PROCEDURE — 90832 PSYTX W PT 30 MINUTES: CPT | Performed by: PSYCHOLOGIST

## 2025-01-06 NOTE — PROGRESS NOTES
Behavioral Health Consultation  Heaven Randle PsyD  Clinical Psychologist  2025    Name: Anu Mckeon  YOB: 2000  PCP: Elena Lauren PA     TELEHEALTH VISIT -- Audio/Visual (During COVID-19 public health emergency)  Time spent with Anu: 30 minutes  This is her second [facility] appointment.  Reason for Consult: depression       Anu Mckeon, was evaluated through a synchronous (real-time) audio-video encounter. The patient (or guardian if applicable) is aware that this is a billable service, which includes applicable co-pays. This Virtual Visit was conducted with patient's (and/or legal guardian's) consent. Patient identification was verified, and a caregiver was present when appropriate.   The patient was located at Home: [address] [place] [address]  Provider was located at Other: [place]  Confirm you are appropriately licensed, registered, or certified to deliver care in the state where the patient is located as indicated above. If you are not or unsure, please re-schedule the visit: Yes, I confirm.      Total time spent for this encounter:  30 min    --Heaven Randle PSYD on 2025 at 12:39 PM    An electronic signature was used to authenticate this note.       S:  Anu is a 24 y.o. female seen per Elena Lauren PA addressing mood dysregulation.  Last visit we began a diagnostic evaluation, including ADHD, due to lifelong attention concerns.  Continued evaluation today, including review of ADHD questionnaires and diagnostic interview below.  She describes symptoms consistent with diagnosis of PTSD, Borderline Personality Disorder, Bulimia Nervosa, and moderate, MDD, single episode (r/o ADHD).  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    Sx of PTSD and depression have been present since age 13 when her mother  from cancer.    In regard to disordered eating, Anu reports preoccupation with bodyshape and weight from early childhood.  This intensified when she was told

## 2025-01-07 DIAGNOSIS — R06.2 WHEEZING: ICD-10-CM

## 2025-01-07 DIAGNOSIS — J45.30 MILD PERSISTENT ASTHMA WITHOUT COMPLICATION: ICD-10-CM

## 2025-01-07 NOTE — TELEPHONE ENCOUNTER
Refill Request     CONFIRM preferred pharmacy with the patient.    If Mail Order Rx - Pend for 90 day refill.      Last Seen: Last Seen Department: 12/31/2024  Last Seen by PCP: 11/6/2024    Last Written: 10/3/24 28 with 2 refills     If no future appointment scheduled:  Review the last OV with PCP and review information for follow-up visit,  Route STAFF MESSAGE with patient name to the  Pool for scheduling with the following information:            -  Timing of next visit           -  Visit type ie Physical, OV, etc           -  Diagnoses/Reason ie. COPD, HTN - Do not use MEDICATION, Follow-up or CHECK UP - Give reason for visit      Next Appointment:   Future Appointments   Date Time Provider Department Center   1/13/2025 11:30 AM Heaven Randle PSYD [facility]   1/30/2025  8:00 AM Elena Lauren PA [facility]       Message sent to  to schedule appt with patient?  NO      Requested Prescriptions     Pending Prescriptions Disp Refills    BREO ELLIPTA 100-25 MCG/ACT inhaler [Pharmacy Med Name: BREO ELLIPTA 100-25 MCG INHALR] 60 each 2     Sig: TAKE 1 PUFF BY MOUTH EVERY DAY

## 2025-01-08 RX ORDER — FLUTICASONE FUROATE AND VILANTEROL TRIFENATATE 100; 25 UG/1; UG/1
POWDER RESPIRATORY (INHALATION)
Qty: 60 EACH | Refills: 2 | Status: SHIPPED | OUTPATIENT
Start: 2025-01-08

## 2025-01-13 ENCOUNTER — TELEMEDICINE (OUTPATIENT)
Dept: PSYCHOLOGY | Age: 25
End: 2025-01-13
Payer: COMMERCIAL

## 2025-01-13 DIAGNOSIS — F43.10 PTSD (POST-TRAUMATIC STRESS DISORDER): ICD-10-CM

## 2025-01-13 DIAGNOSIS — F33.1 MDD (MAJOR DEPRESSIVE DISORDER), RECURRENT EPISODE, MODERATE (HCC): Primary | ICD-10-CM

## 2025-01-13 DIAGNOSIS — F90.2 ADHD (ATTENTION DEFICIT HYPERACTIVITY DISORDER), COMBINED TYPE: ICD-10-CM

## 2025-01-13 DIAGNOSIS — F50.22 BULIMIA NERVOSA, MODERATE: ICD-10-CM

## 2025-01-13 PROCEDURE — 90791 PSYCH DIAGNOSTIC EVALUATION: CPT | Performed by: PSYCHOLOGIST

## 2025-01-13 NOTE — PROGRESS NOTES
Behavioral Health Consultation  Heaven Randle PsyD  Clinical Psychologist  2025    Name: Anu Mckeon  YOB: 2000  PCP: Eelna Lauren PA     TELEHEALTH VISIT -- Audio/Visual (During COVID-19 public health emergency)  Time spent with Anu: 30 minutes  This is her third [facility] appointment.  Reason for Consult: No chief complaint on file.       Anu Mckeon, was evaluated through a synchronous (real-time) audio-video encounter. The patient (or guardian if applicable) is aware that this is a billable service, which includes applicable co-pays. This Virtual Visit was conducted with patient's (and/or legal guardian's) consent. Patient identification was verified, and a caregiver was present when appropriate.   The patient was located at Home: [address] [place] [address]  Provider was located at Other: [place]  Confirm you are appropriately licensed, registered, or certified to deliver care in the state where the patient is located as indicated above. If you are not or unsure, please re-schedule the visit: Yes, I confirm.      Total time spent for this encounter:  30 min    --Heaven Randle PSYD on 2025 at 11:01 AM    An electronic signature was used to authenticate this note.       S:  Anu is a 24 y.o. female seen per Elena Lauren PA addressing mood dysregulation.  Last visit we began a diagnostic evaluation, including ADHD, due to lifelong attention concerns.  Continued evaluation today, including review of ADHD questionnaires and diagnostic interview below.  She describes symptoms consistent with diagnosis of PTSD, Borderline Personality Disorder, Bulimia Nervosa, and moderate, MDD, single episode (r/o ADHD).  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     Trauma sx and depression have been present since age 13 when her mother  from cancer.    Completed ADHD portion of evaluation below.    Developmental History:  Denies complications at birth. Reports having met

## 2025-01-14 ENCOUNTER — TELEPHONE (OUTPATIENT)
Dept: BEHAVIORAL/MENTAL HEALTH | Age: 25
End: 2025-01-14

## 2025-01-14 NOTE — TELEPHONE ENCOUNTER
Robby Parker,    This is the patient we discussed yesterday.  We completed the eval and results are suggestive of Bulimia Nervosa, PTSD, Borderline PD, MDD, recurrent, moderate, and possible ADHD, combined presentation. Since the trauma sx have been active since childhood, it was difficult to tease out ADHD vs. Trauma sx. However, her reports and answers on objective tests did support the diagnosis.  She would like to continue medication management with you and asked what could be adjusted/added. What do you think?    I recommended to her that she see an eating disorder specialist, since these sx are currently active (restricting, binging, purging daily).  I will be sending her referral options and will continue to see her until she is participating in a program.    Thank you!  Heaven

## 2025-01-16 NOTE — TELEPHONE ENCOUNTER
Robby Collado,   Thank you for the update.   I will discuss treatment options with her at her upcoming appointment.

## 2025-01-30 ENCOUNTER — OFFICE VISIT (OUTPATIENT)
Dept: FAMILY MEDICINE CLINIC | Age: 25
End: 2025-01-30
Payer: COMMERCIAL

## 2025-01-30 ENCOUNTER — PATIENT MESSAGE (OUTPATIENT)
Dept: FAMILY MEDICINE CLINIC | Age: 25
End: 2025-01-30

## 2025-01-30 VITALS
BODY MASS INDEX: 43.74 KG/M2 | TEMPERATURE: 97.1 F | WEIGHT: 271 LBS | DIASTOLIC BLOOD PRESSURE: 74 MMHG | OXYGEN SATURATION: 98 % | SYSTOLIC BLOOD PRESSURE: 110 MMHG | HEART RATE: 78 BPM

## 2025-01-30 DIAGNOSIS — Z86.59 HISTORY OF SUICIDAL IDEATION: ICD-10-CM

## 2025-01-30 DIAGNOSIS — F33.2 SEVERE EPISODE OF RECURRENT MAJOR DEPRESSIVE DISORDER, WITHOUT PSYCHOTIC FEATURES (HCC): Primary | ICD-10-CM

## 2025-01-30 DIAGNOSIS — F50.89 BINGE-PURGE BEHAVIOR: ICD-10-CM

## 2025-01-30 PROCEDURE — 99214 OFFICE O/P EST MOD 30 MIN: CPT | Performed by: PHYSICIAN ASSISTANT

## 2025-01-30 RX ORDER — FLUOXETINE 40 MG/1
40 CAPSULE ORAL DAILY
Qty: 30 CAPSULE | Refills: 3 | Status: SHIPPED | OUTPATIENT
Start: 2025-01-30

## 2025-01-30 SDOH — ECONOMIC STABILITY: FOOD INSECURITY: WITHIN THE PAST 12 MONTHS, THE FOOD YOU BOUGHT JUST DIDN'T LAST AND YOU DIDN'T HAVE MONEY TO GET MORE.: NEVER TRUE

## 2025-01-30 SDOH — ECONOMIC STABILITY: FOOD INSECURITY: WITHIN THE PAST 12 MONTHS, YOU WORRIED THAT YOUR FOOD WOULD RUN OUT BEFORE YOU GOT MONEY TO BUY MORE.: NEVER TRUE

## 2025-01-30 ASSESSMENT — PATIENT HEALTH QUESTIONNAIRE - PHQ9
SUM OF ALL RESPONSES TO PHQ QUESTIONS 1-9: 24
SUM OF ALL RESPONSES TO PHQ QUESTIONS 1-9: 21
1. LITTLE INTEREST OR PLEASURE IN DOING THINGS: NEARLY EVERY DAY
10. IF YOU CHECKED OFF ANY PROBLEMS, HOW DIFFICULT HAVE THESE PROBLEMS MADE IT FOR YOU TO DO YOUR WORK, TAKE CARE OF THINGS AT HOME, OR GET ALONG WITH OTHER PEOPLE: EXTREMELY DIFFICULT
2. FEELING DOWN, DEPRESSED OR HOPELESS: NEARLY EVERY DAY
5. POOR APPETITE OR OVEREATING: NEARLY EVERY DAY
8. MOVING OR SPEAKING SO SLOWLY THAT OTHER PEOPLE COULD HAVE NOTICED. OR THE OPPOSITE, BEING SO FIGETY OR RESTLESS THAT YOU HAVE BEEN MOVING AROUND A LOT MORE THAN USUAL: NOT AT ALL
4. FEELING TIRED OR HAVING LITTLE ENERGY: NEARLY EVERY DAY
9. THOUGHTS THAT YOU WOULD BE BETTER OFF DEAD, OR OF HURTING YOURSELF: NEARLY EVERY DAY
SUM OF ALL RESPONSES TO PHQ QUESTIONS 1-9: 24
6. FEELING BAD ABOUT YOURSELF - OR THAT YOU ARE A FAILURE OR HAVE LET YOURSELF OR YOUR FAMILY DOWN: NEARLY EVERY DAY
SUM OF ALL RESPONSES TO PHQ9 QUESTIONS 1 & 2: 6
SUM OF ALL RESPONSES TO PHQ QUESTIONS 1-9: 24
3. TROUBLE FALLING OR STAYING ASLEEP: NEARLY EVERY DAY
7. TROUBLE CONCENTRATING ON THINGS, SUCH AS READING THE NEWSPAPER OR WATCHING TELEVISION: NEARLY EVERY DAY

## 2025-01-30 ASSESSMENT — COLUMBIA-SUICIDE SEVERITY RATING SCALE - C-SSRS
3. HAVE YOU BEEN THINKING ABOUT HOW YOU MIGHT KILL YOURSELF?: YES
5. HAVE YOU STARTED TO WORK OUT OR WORKED OUT THE DETAILS OF HOW TO KILL YOURSELF? DO YOU INTEND TO CARRY OUT THIS PLAN?: YES
6. HAVE YOU EVER DONE ANYTHING, STARTED TO DO ANYTHING, OR PREPARED TO DO ANYTHING TO END YOUR LIFE?: YES
1. WITHIN THE PAST MONTH, HAVE YOU WISHED YOU WERE DEAD OR WISHED YOU COULD GO TO SLEEP AND NOT WAKE UP?: YES
7. DID THIS OCCUR IN THE LAST THREE MONTHS: NO
2. HAVE YOU ACTUALLY HAD ANY THOUGHTS OF KILLING YOURSELF?: YES
4. HAVE YOU HAD THESE THOUGHTS AND HAD SOME INTENTION OF ACTING ON THEM?: YES

## 2025-01-30 ASSESSMENT — ANXIETY QUESTIONNAIRES
2. NOT BEING ABLE TO STOP OR CONTROL WORRYING: NEARLY EVERY DAY
6. BECOMING EASILY ANNOYED OR IRRITABLE: NEARLY EVERY DAY
3. WORRYING TOO MUCH ABOUT DIFFERENT THINGS: NEARLY EVERY DAY
4. TROUBLE RELAXING: NEARLY EVERY DAY
5. BEING SO RESTLESS THAT IT IS HARD TO SIT STILL: NOT AT ALL
IF YOU CHECKED OFF ANY PROBLEMS ON THIS QUESTIONNAIRE, HOW DIFFICULT HAVE THESE PROBLEMS MADE IT FOR YOU TO DO YOUR WORK, TAKE CARE OF THINGS AT HOME, OR GET ALONG WITH OTHER PEOPLE: EXTREMELY DIFFICULT
GAD7 TOTAL SCORE: 18
1. FEELING NERVOUS, ANXIOUS, OR ON EDGE: NEARLY EVERY DAY
7. FEELING AFRAID AS IF SOMETHING AWFUL MIGHT HAPPEN: NEARLY EVERY DAY

## 2025-01-30 ASSESSMENT — ENCOUNTER SYMPTOMS
NAUSEA: 0
VOMITING: 0
COUGH: 0
RHINORRHEA: 0
ABDOMINAL PAIN: 0
SORE THROAT: 0
DIARRHEA: 0
CONSTIPATION: 0
SHORTNESS OF BREATH: 0

## 2025-01-30 NOTE — PROGRESS NOTES
Appearance: She is well-developed.   HENT:      Head: Normocephalic and atraumatic.   Eyes:      Extraocular Movements: Extraocular movements intact.      Conjunctiva/sclera: Conjunctivae normal.      Pupils: Pupils are equal, round, and reactive to light.   Cardiovascular:      Rate and Rhythm: Normal rate and regular rhythm.      Heart sounds: Normal heart sounds. No murmur heard.  Pulmonary:      Effort: Pulmonary effort is normal.      Breath sounds: Normal breath sounds. No wheezing.   Abdominal:      General: Bowel sounds are normal.      Palpations: Abdomen is soft.      Tenderness: There is no abdominal tenderness.   Musculoskeletal:      Cervical back: Neck supple.   Lymphadenopathy:      Cervical: No cervical adenopathy.   Skin:     General: Skin is warm and dry.      Findings: No rash.   Neurological:      Mental Status: She is alert and oriented to person, place, and time.   Psychiatric:         Mood and Affect: Mood normal.         Behavior: Behavior normal.         Thought Content: Thought content includes suicidal ideation. Thought content includes suicidal plan.         Cognition and Memory: Cognition normal.         Judgment: Judgment normal.

## 2025-01-31 NOTE — TELEPHONE ENCOUNTER
Completed form scanned in and all needed information routed to [facility] via [employer] at [phone]    Patient made aware.    Left a voicemail with [facility] to inform her referral has been sent to them.

## 2025-01-31 NOTE — TELEPHONE ENCOUNTER
Received call back from Zeenat Ugalde RN regarding scheduling for patient. They are not able to see her soon enough.  We will move forward with [facility]     Referral faxed to [facility]. I will call and follow up.

## 2025-01-31 NOTE — TELEPHONE ENCOUNTER
Thanks, Vickie.   Per our conversation, can you fax the completed form, last OV note and demosheet and insurance info?     Thank you.     ASTON Rosario.

## 2025-01-31 NOTE — TELEPHONE ENCOUNTER
Robby Rosario,   Thanks for calling. Pt is hoping to get plan in motion prior to the weekend, I'm not sure we are going to have much luck with IOP today. As discussed, can we see about [facility]?

## 2025-01-31 NOTE — TELEPHONE ENCOUNTER
Patient called asking if provider could fill out referral form to [facility] - Patient would like to try the PHP program. I have printed this referral and placed in providers box for review.

## 2025-01-31 NOTE — TELEPHONE ENCOUNTER
Called [facility] and was informed to speak with Zeenat Ugalde RN. She over sees the [facility] BHI program. Office number-- [phone]    [facility] for Zeenat to return call regarding process to get patient schedule with [facility]

## 2025-01-31 NOTE — TELEPHONE ENCOUNTER
Called patient, informed that [facility] was booked too far out and that we are going to try [facility].    Patient would prefer an in person program but she is agreeable with virtual for now. She is going to look into other options and let us know if she finds something local that she would prefer. Advised [facility] will be in touch with her. Patient verbalized understanding and agrees with plan.     I called [facility] intake team- [phone]. Spoke with Gina, she will reach out to the patient once referral comes through. Provided her with patient information. The outreach team will let us know once they have contacted the patient and will inform us the status of her enrollment.

## 2025-02-05 ENCOUNTER — HOSPITAL ENCOUNTER (OUTPATIENT)
Dept: PSYCHIATRY | Age: 25
Setting detail: THERAPIES SERIES
Discharge: HOME | End: 2025-02-05
Payer: COMMERCIAL

## 2025-02-05 ASSESSMENT — ANXIETY QUESTIONNAIRES
3. WORRYING TOO MUCH ABOUT DIFFERENT THINGS: NEARLY EVERY DAY
IF YOU CHECKED OFF ANY PROBLEMS ON THIS QUESTIONNAIRE, HOW DIFFICULT HAVE THESE PROBLEMS MADE IT FOR YOU TO DO YOUR WORK, TAKE CARE OF THINGS AT HOME, OR GET ALONG WITH OTHER PEOPLE: EXTREMELY DIFFICULT
GAD7 TOTAL SCORE: 19
7. FEELING AFRAID AS IF SOMETHING AWFUL MIGHT HAPPEN: NEARLY EVERY DAY
6. BECOMING EASILY ANNOYED OR IRRITABLE: MORE THAN HALF THE DAYS
2. NOT BEING ABLE TO STOP OR CONTROL WORRYING: NEARLY EVERY DAY
1. FEELING NERVOUS, ANXIOUS, OR ON EDGE: NEARLY EVERY DAY
5. BEING SO RESTLESS THAT IT IS HARD TO SIT STILL: MORE THAN HALF THE DAYS
4. TROUBLE RELAXING: NEARLY EVERY DAY

## 2025-02-05 ASSESSMENT — SLEEP AND FATIGUE QUESTIONNAIRES
DO YOU HAVE DIFFICULTY SLEEPING: YES
DO YOU USE A SLEEP AID: NO
AVERAGE NUMBER OF SLEEP HOURS: 6
SLEEP PATTERN: RESTLESSNESS;DIFFICULTY FALLING ASLEEP;DIFFICULTY ARISING;DISTURBED/INTERRUPTED SLEEP

## 2025-02-05 ASSESSMENT — PATIENT HEALTH QUESTIONNAIRE - PHQ9
SUM OF ALL RESPONSES TO PHQ QUESTIONS 1-9: 23
1. LITTLE INTEREST OR PLEASURE IN DOING THINGS: NEARLY EVERY DAY
8. MOVING OR SPEAKING SO SLOWLY THAT OTHER PEOPLE COULD HAVE NOTICED. OR THE OPPOSITE, BEING SO FIGETY OR RESTLESS THAT YOU HAVE BEEN MOVING AROUND A LOT MORE THAN USUAL: SEVERAL DAYS
4. FEELING TIRED OR HAVING LITTLE ENERGY: MORE THAN HALF THE DAYS
3. TROUBLE FALLING OR STAYING ASLEEP: MORE THAN HALF THE DAYS
SUM OF ALL RESPONSES TO PHQ QUESTIONS 1-9: 23
SUM OF ALL RESPONSES TO PHQ9 QUESTIONS 1 & 2: 6
6. FEELING BAD ABOUT YOURSELF - OR THAT YOU ARE A FAILURE OR HAVE LET YOURSELF OR YOUR FAMILY DOWN: NEARLY EVERY DAY
2. FEELING DOWN, DEPRESSED OR HOPELESS: NEARLY EVERY DAY
SUM OF ALL RESPONSES TO PHQ QUESTIONS 1-9: 20
7. TROUBLE CONCENTRATING ON THINGS, SUCH AS READING THE NEWSPAPER OR WATCHING TELEVISION: NEARLY EVERY DAY
SUM OF ALL RESPONSES TO PHQ QUESTIONS 1-9: 23
5. POOR APPETITE OR OVEREATING: NEARLY EVERY DAY
9. THOUGHTS THAT YOU WOULD BE BETTER OFF DEAD, OR OF HURTING YOURSELF: NEARLY EVERY DAY
10. IF YOU CHECKED OFF ANY PROBLEMS, HOW DIFFICULT HAVE THESE PROBLEMS MADE IT FOR YOU TO DO YOUR WORK, TAKE CARE OF THINGS AT HOME, OR GET ALONG WITH OTHER PEOPLE: EXTREMELY DIFFICULT

## 2025-02-05 ASSESSMENT — LIFESTYLE VARIABLES
HOW MANY STANDARD DRINKS CONTAINING ALCOHOL DO YOU HAVE ON A TYPICAL DAY: 7 TO 9
HOW OFTEN DO YOU HAVE A DRINK CONTAINING ALCOHOL: 2-4 TIMES A MONTH
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
## 2025-02-05 NOTE — BH NOTE
Writer gave pt estimate of OOP cost for program per RADHA Briggs.     Date: 2/4/2025  Information taken from R portal     Policy Effective Date: 1/1/2025     Coinsurance/Copay: 10% to OOP Max once Deductible is met.     Current Accumulations:  Deductible: $558.50/$1,650 - $1,091.50 Remaining  Out of Pocket: $558.50/$2,77668 - $1,941.50 Remaining     Auth Required: Not for IOP.    Pt agreed to start IOP on 2/7/2025.

## 2025-02-07 ENCOUNTER — HOSPITAL ENCOUNTER (OUTPATIENT)
Dept: PSYCHIATRY | Age: 25
Setting detail: THERAPIES SERIES
Discharge: HOME | End: 2025-02-07
Payer: COMMERCIAL

## 2025-02-07 DIAGNOSIS — F41.9 ANXIETY: Primary | ICD-10-CM

## 2025-02-07 PROCEDURE — 90853 GROUP PSYCHOTHERAPY: CPT

## 2025-02-07 NOTE — BH NOTE
Writer checked in with patient due to her marking thoughts of self harm. Patient reports that she had a breakup recently and her dog was diagnosed with cancer. She reports that she has intrusive passive thoughts, but does not have a plan to act on it. Patient is agreeable to speak with members of the team if anything changes. Patient reports that she wants a better outlook on life during this program.

## 2025-02-07 NOTE — GROUP NOTE
Group Therapy Note    Date: 2/7/2025    Group Start Time: 10:00 AM  Group End Time: 11:00 AM  Group Topic: Recreational    [facility] Livia Breen        Group Therapy Note    Attendees: 9    Group members were given popcorn and watched the movie \"Inside Out.\" The goal for group was to explain the importance of exploring emotions, teaching emotions, and validating emotions.      Notes:  Anu attended group for the full duration. Anu engaged in the movie. Anu remained appropriate during her time in group.     Status After Intervention:  Improved    Participation Level: Active Listener and Interactive    Participation Quality: Appropriate, Attentive, and Sharing      Speech:  normal      Thought Process/Content: Logical      Affective Functioning: Congruent      Mood: euthymic      Level of consciousness:  Alert, Oriented x4, and Attentive      Response to Learning: Able to verbalize current knowledge/experience      Endings: None Reported    Modes of Intervention: Socialization, Exploration, and Activity      Discipline Responsible: Behavorial Health Tech      Signature:  GINNA FLORES

## 2025-02-07 NOTE — GROUP NOTE
Group Therapy Note    Date: 2/7/2025    Group Start Time:  8:30 AM  Group End Time:  9:45 AM  Group Topic: Psychotherapy    [facility] BH[facility]berly Hart LISW        Group Therapy Note    Attendees: 9     Group participants engaged in agenda-setting psychotherapy session. Participants identified their personal goals for group, focusing on the present moment, current stressors, and relationship dynamics. Participants worked to offer/accept additional perspectives and feedback from each other to help peers process their thoughts/feelings while also progressing toward identified goal.        Patient's Goal:  \"get comfortable \"    Notes: pt attended group for the full duration. This is pt's first day in the program. She shared with the group that she is here for anxiety and depression.     Status After Intervention:  Improved    Participation Level: Active Listener and Interactive    Participation Quality: Appropriate, Attentive, and Sharing      Speech:  normal      Thought Process/Content: Logical      Affective Functioning: Congruent      Mood: anxious      Level of consciousness:  Alert and Attentive      Response to Learning: Able to verbalize current knowledge/experience      Endings: None Reported    Modes of Intervention: Education, Support, Socialization, Exploration, Clarifying, and Problem-solving      Discipline Responsible: /Counselor      Signature:  ROBERT Savage

## 2025-02-07 NOTE — GROUP NOTE
Group Therapy Note    Date: 2/7/2025    Group Start Time: 11:15 AM  Group End Time: 12:15 PM  Group Topic: Recreational    [facility] Livia Breen        Group Therapy Note    Attendees: 9    Group members were given popcorn and continued watching the movie \"Inside Out.\" The goal for group was to explain the importance of exploring emotions, teaching emotions, and validating emotions.      Notes:  Anu attended group for the full duration. Anu engaged in the movie and shared her thoughts on the importance of watching this movie in IOP. Anu remained appropriate during her time in group.     Status After Intervention:  Improved    Participation Level: Active Listener and Interactive    Participation Quality: Appropriate, Attentive, and Sharing      Speech:  normal      Thought Process/Content: Logical      Affective Functioning: Congruent      Mood: euthymic      Level of consciousness:  Alert, Oriented x4, and Attentive      Response to Learning: Able to verbalize current knowledge/experience      Endings: None Reported    Modes of Intervention: Socialization, Exploration, and Activity      Discipline Responsible: Behavorial Health Tech      Signature:  GINNA FLORES

## 2025-02-10 ENCOUNTER — HOSPITAL ENCOUNTER (OUTPATIENT)
Dept: PSYCHIATRY | Age: 25
Setting detail: THERAPIES SERIES
Discharge: HOME | End: 2025-02-10
Payer: COMMERCIAL

## 2025-02-10 VITALS
DIASTOLIC BLOOD PRESSURE: 72 MMHG | TEMPERATURE: 97.8 F | RESPIRATION RATE: 17 BRPM | OXYGEN SATURATION: 97 % | SYSTOLIC BLOOD PRESSURE: 132 MMHG | HEART RATE: 75 BPM

## 2025-02-10 DIAGNOSIS — F33.1 MODERATE EPISODE OF RECURRENT MAJOR DEPRESSIVE DISORDER (HCC): Primary | ICD-10-CM

## 2025-02-10 PROCEDURE — 90853 GROUP PSYCHOTHERAPY: CPT

## 2025-02-10 PROCEDURE — 90792 PSYCH DIAG EVAL W/MED SRVCS: CPT | Performed by: PSYCHIATRY & NEUROLOGY

## 2025-02-10 NOTE — GROUP NOTE
Group Therapy Note    Date: 2/10/2025    Group Start Time:  8:30 AM  Group End Time:  9:45 AM  Group Topic: Psychotherapy    [facility] Chanel Jiménez MSW        Group Therapy Note  Group participants engaged in agenda-setting psychotherapy session. Participants identified their personal goals for group, focusing on the present moment, current stressors, and relationship dynamics. Participants worked to offer/accept additional perspectives and feedback from each other to help peers process their thoughts/feelings while also progressing toward identified goal.   Attendees: 9       Patient's Goal:  Patient set her goal to take her dog for a walk today. She stated she is learning to set boundaries with family.\"    Notes:  Patient actively listened to others during the group. Patient was able to share their goal, accept feedback from other members and provide feedback to them.    Status After Intervention:  Improved    Participation Level: Active Listener and Interactive    Participation Quality: Appropriate, Attentive, Sharing, and Supportive      Speech:  normal      Thought Process/Content: Logical      Affective Functioning: Congruent      Mood: anxious      Level of consciousness:  Alert      Response to Learning: Able to verbalize current knowledge/experience      Endings: None Reported    Modes of Intervention: Support and Exploration      Discipline Responsible: /Counselor      Signature:  GINNA Cruz

## 2025-02-10 NOTE — GROUP NOTE
Group Therapy Note    Date: 2/10/2025    Group Start Time: 11:15 AM  Group End Time: 12:15 PM  Group Topic: Recreational    [facility] Livia Breen        Group Therapy Note    Attendees: 10    Group members were given popcorn and continued watching the movie \"Inside Out 2.\" The goal for group was to explain the importance of exploring emotions, teaching emotions, and validating emotions.      Notes:  Anu attended group for the full duration. Anu shared her thoughts on the movie. Anu remained engaged and interacted approrpiately with her peers.    Status After Intervention:  Improved    Participation Level: Active Listener and Interactive    Participation Quality: Appropriate, Attentive, and Sharing      Speech:  normal      Thought Process/Content: Logical      Affective Functioning: Congruent      Mood: euthymic      Level of consciousness:  Alert, Oriented x4, and Attentive      Response to Learning: Able to verbalize current knowledge/experience      Endings: None Reported    Modes of Intervention: Socialization, Exploration, and Activity      Discipline Responsible: Behavorial Health Tech      Signature:  GINNA FLORES

## 2025-02-10 NOTE — GROUP NOTE
Group Therapy Note    Date: 2/10/2025    Group Start Time: 10:00 AM  Group End Time: 11:00 AM  Group Topic: Recreational    [facility] Livia Breen        Group Therapy Note    Attendees: 10    Group members were given popcorn and watched the movie \"Inside Out 2.\" The goal for group was to explain the importance of exploring emotions, teaching emotions, and validating emotions.      Notes:  Anu attended group for the full duration. Anu engaged in the movie. Anu remained appropriate during her time in group.     Status After Intervention:  Improved    Participation Level: Active Listener and Interactive    Participation Quality: Appropriate, Attentive, and Sharing      Speech:  normal      Thought Process/Content: Logical      Affective Functioning: Congruent      Mood: euthymic      Level of consciousness:  Alert, Oriented x4, and Attentive      Response to Learning: Able to verbalize current knowledge/experience      Endings: None Reported    Modes of Intervention: Socialization, Exploration, and Activity      Discipline Responsible: Behavorial Health Tech      Signature:  GINNA FLORES

## 2025-02-11 PROBLEM — F32.A DEPRESSION: Status: ACTIVE | Noted: 2025-02-11
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
## 2025-02-12 ENCOUNTER — HOSPITAL ENCOUNTER (OUTPATIENT)
Dept: PSYCHIATRY | Age: 25
Setting detail: THERAPIES SERIES
Discharge: HOME | End: 2025-02-12
Payer: COMMERCIAL

## 2025-02-12 DIAGNOSIS — F33.1 MODERATE EPISODE OF RECURRENT MAJOR DEPRESSIVE DISORDER (HCC): Primary | ICD-10-CM

## 2025-02-12 PROCEDURE — 90853 GROUP PSYCHOTHERAPY: CPT

## 2025-02-12 NOTE — GROUP NOTE
Group Therapy Note    Date: 2/12/2025    Group Start Time:  8:30 AM  Group End Time:  9:45 AM  Group Topic: Psychotherapy    [facility] Livia Jose LPC    Group participants engaged in agenda-setting psychotherapy session. Participants identified their personal goals for group, focusing on the present moment, current stressors, and relationship dynamics. Participants worked to offer/accept additional perspectives and feedback from each other to help peers process their thoughts/feelings while also progressing toward identified goal.     Group Therapy Note    Attendees: 8       Patient's Goal:  \"go to the gym\"    Notes:  Patient engaged throughout the duration of the group. Patient was able to understand the perspectives of other peers' experiences and offered insight/connection. Patient explored stressors within family dynamic, feeling that she is always \"put in the middle\" and needs to set boundaries. Patient was able to create a goal to enhance daily functioning/relationships.     Status After Intervention:  Improved    Participation Level: Active Listener and Interactive    Participation Quality: Appropriate, Attentive, Sharing, and Supportive      Speech:  normal      Thought Process/Content: Linear      Affective Functioning: Congruent      Mood: anxious      Level of consciousness:  Alert      Response to Learning: Able to verbalize current knowledge/experience      Endings: None Reported    Modes of Intervention: Support, Exploration, Clarifying, and Problem-solving      Discipline Responsible: /Counselor      Signature:  Livia Edmond LPC

## 2025-02-12 NOTE — GROUP NOTE
Group Therapy Note    Date: 2/12/2025    Group Start Time: 10:00 AM  Group End Time: 10:45 AM  Group Topic: Psychoeducation    [facility] L[facility]        Group Therapy Note    Attendees: 8       Patient's Goal:  pt's given worksheets on CBT. Discussed the theory that what we think about a situation impacts how we  then feel and then how we behave. Pt's asked to apply to their lives. We worked together on a thought challenging record.      Notes:  pt attended group for the full duration. She actively participated in group discussion.she was able to apply to herself when completing her thought challenging record.    Status After Intervention:  Improved    Participation Level: Active Listener and Interactive    Participation Quality: Appropriate, Attentive, and Sharing      Speech:  normal      Thought Process/Content: Logical      Affective Functioning: Congruent      Mood: euthymic      Level of consciousness:  Alert and Attentive      Response to Learning: Able to verbalize current knowledge/experience and Able to verbalize/acknowledge new learning      Endings: None Reported    Modes of Intervention: Education, Support, Socialization, Exploration, and Clarifying      Discipline Responsible: /Counselor      Signature:  ROBERT Savage

## 2025-02-12 NOTE — GROUP NOTE
Group Therapy Note    Date: 2/12/2025    Group Start Time: 11:15 AM  Group End Time: 12:15 PM  Group Topic: Cognitive Skills    Livia Butt        Group Therapy Note    Attendees: 8    Group members were given plant pots and supplies and asked to make their own \"Cherelle it Bucket. The group members were then asked to write down 3 negatives about themselves that they would like to change. After they read it out loud, they then crumpled it up and threw it in their \"Cherelle it Bucket.\"     Notes:  Anu attended group for the full duration. Anu made her own cherelle it bucket and shared her negatives with the group. Anu remained engaged and interacted approrpiately with her peers.     Status After Intervention:  Improved    Participation Level: Active Listener and Interactive    Participation Quality: Appropriate, Attentive, and Sharing      Speech:  normal      Thought Process/Content: Logical      Affective Functioning: Congruent      Mood: euthymic      Level of consciousness:  Alert, Oriented x4, and Attentive      Response to Learning: Able to verbalize current knowledge/experience      Endings: None Reported    Modes of Intervention: Socialization, Exploration, and Activity      Discipline Responsible: Behavorial Health Tech      Signature:  GINNA FLORES

## 2025-02-14 ENCOUNTER — HOSPITAL ENCOUNTER (OUTPATIENT)
Dept: PSYCHIATRY | Age: 25
Setting detail: THERAPIES SERIES
Discharge: HOME | End: 2025-02-14
Payer: COMMERCIAL

## 2025-02-14 VITALS
DIASTOLIC BLOOD PRESSURE: 63 MMHG | SYSTOLIC BLOOD PRESSURE: 125 MMHG | TEMPERATURE: 97.8 F | HEART RATE: 81 BPM | RESPIRATION RATE: 17 BRPM | OXYGEN SATURATION: 99 %

## 2025-02-14 DIAGNOSIS — F33.1 MODERATE EPISODE OF RECURRENT MAJOR DEPRESSIVE DISORDER (HCC): Primary | ICD-10-CM

## 2025-02-14 PROCEDURE — 90853 GROUP PSYCHOTHERAPY: CPT

## 2025-02-14 PROCEDURE — 99215 OFFICE O/P EST HI 40 MIN: CPT | Performed by: PSYCHIATRY & NEUROLOGY

## 2025-02-14 NOTE — GROUP NOTE
Group Therapy Note    Date: 2/14/2025    Group Start Time: 11:15 AM  Group End Time: 12:15 PM  Group Topic: Cognitive Skills    Livia Butt        Group Therapy Note    Attendees: 9    Group members were given a worksheet on \"Automatic Negative Thoughts.\" The group members were asked to determine their triggers, automatic thoughts, and change it to a new thought. When finished group members were given fly swatters and asked to \"squash\" their negative thoughts.       Notes:  Anu attended group for the full duration. Anu shared her thoughts with the group. Anu remained engaged and interacted appropriately with her peers.     Status After Intervention:  Improved    Participation Level: Active Listener and Interactive    Participation Quality: Appropriate, Attentive, and Sharing      Speech:  normal      Thought Process/Content: Logical      Affective Functioning: Congruent      Mood: euthymic      Level of consciousness:  Alert, Oriented x4, and Attentive      Response to Learning: Able to verbalize current knowledge/experience      Endings: None Reported    Modes of Intervention: Socialization, Exploration, and Activity      Discipline Responsible: Behavorial Health Tech      Signature:  GINNA FLORES

## 2025-02-14 NOTE — GROUP NOTE
Group Therapy Note    Date: 2/14/2025    Group Start Time: 10:00 AM  Group End Time: 11:00 AM  Group Topic: Cognitive Skills    [facility]    Group members engaged in discussion on CBT [place] to discuss connection between thought, emotion and action. Members were able to explore automatic thoughts and practice reframing the thought for better outcomes. Group members practiced identifying automatic thoughts and reframing with various scenarios. They were able to explore cognitive distortions and how they impact automatic thoughts.     Group Therapy Note    Attendees: 9       Notes:  Patient was present throughout the duration of the group. Patient participated in activity and interacted with peers appropriately.     Status After Intervention:  Improved    Participation Level: Active Listener and Interactive    Participation Quality: Appropriate, Attentive, Sharing, and Supportive      Speech:  normal      Thought Process/Content: Linear      Affective Functioning: Congruent      Mood: anxious      Level of consciousness:  Alert      Response to Learning: Able to verbalize current knowledge/experience and Able to verbalize/acknowledge new learning      Endings: None Reported    Modes of Intervention: Education, Support, Exploration, Clarifying, and Problem-solving      Discipline Responsible: /Counselor      Signature:  Livia Edmond LPC

## 2025-02-14 NOTE — GROUP NOTE
Group Therapy Note    Date: 2/14/2025    Group Start Time:  8:30 AM  Group End Time:  9:45 AM  Group Topic: Psychotherapy    [facility] Livia Jose LPC    Group participants engaged in agenda-setting psychotherapy session. Participants identified their personal goals for group, focusing on the present moment, current stressors, and relationship dynamics. Participants worked to offer/accept additional perspectives and feedback from each other to help peers process their thoughts/feelings while also progressing toward identified goal.     Group Therapy Note    Attendees: 7       Patient's Goal:  To not smoke until bedtime.     Notes:   Patient was present throughout the group. Patient was able to share perspectives and participated in discussion. Patient shared that she has been able to recognize bad thinking patterns and working to create a routine outside of IOP. Patient interacted with peers appropriately and accepted feedback.    Status After Intervention:  Improved    Participation Level: Active Listener and Interactive    Participation Quality: Appropriate, Attentive, Sharing, and Supportive      Speech:  normal      Thought Process/Content: Linear      Affective Functioning: Congruent      Mood: anxious      Level of consciousness:  Alert      Response to Learning: Able to verbalize current knowledge/experience and Able to verbalize/acknowledge new learning      Endings: None Reported    Modes of Intervention: Support, Exploration, Clarifying, and Problem-solving      Discipline Responsible: /Counselor      Signature:  Livia Edmond LPC

## 2025-02-14 NOTE — BH NOTE
Outpatient Multidisciplinary Treatment Team Progress Note     Date of Review: 2/14/25     Outpatient Multidisciplinary Treatment Team met to discuss and review patient's progress in group and individual sessions.      Current Goal(s):   \"go to the gym\"      Progress Made Toward Goal(s): Anu is currently working on  managing family dynamics as well as setting boundaries. Anu has been attending groups and getting more comfortable sharing in groups and being vulnerable.      Admission Information:     Presenting Problem:  MDD   Treatment Start Date: 2/7/25  Tentative Discharge Date: 3/19/25

## 2025-02-15 NOTE — PROGRESS NOTES
Department of Psychiatry  Progress Note    Patient's chart was reviewed. Discussed with treatment team. Met with patient.     SUBJECTIVE:      Feels she's making gains with programming.    Far fewer intrusive thoughts of self harm this week.    Struggles to set boundaries with her family.     Also struggles with negative thought processes - discussed at length - including the book Feeling Good.    Agreed to continue with current regimen for now given she's improving in groups.     ROS:   Patient has new complaints: no  Sleeping adequately:  Yes   Appetite adequate: Yes  Engaged in programming: Yes    OBJECTIVE:  VITALS:  /63   Pulse 81   Temp 97.8 °F (36.6 °C) (Temporal)   Resp 17   LMP  (LMP Unknown)   SpO2 99%     Mental Status Examination:    Appearance: good hygiene   Behavior/Attitude toward examiner:  cooperative, attentive and fair eye contact  Speech:  spontaneous, normal rate, normal volume and well articulated   Mood:  \"better\"  Affect:  Mood congruent   Thought processes:  Goal directed, linear, no FRANCI or gross disorganization  Thought Content: less SI, no HI, no delusions voiced, no obsessions  Perceptions: no AVH, not RTIS  Attention: attention span and concentration were intact to interview   Abstraction: intact  Cognition: Average VERONICA, Alert and oriented to person, place, time, and situation, recall intact  Insight: intact  Judgment: intact    Medication:  Current Outpatient Medications   Medication Sig Dispense Refill    FLUoxetine (PROZAC) 40 MG capsule Take 1 capsule by mouth daily 30 capsule 3    BREO ELLIPTA 100-25 MCG/ACT inhaler TAKE 1 PUFF BY MOUTH EVERY DAY 60 each 2    albuterol sulfate HFA (VENTOLIN HFA) 108 (90 Base) MCG/ACT inhaler Inhale 2 puffs into the lungs 4 times daily as needed for Wheezing 18 g 5     No current facility-administered medications for this encounter.     Formulation: domiciled, never-, and employed 24yo with a history of multiple psychiatric

## 2025-02-17 ENCOUNTER — HOSPITAL ENCOUNTER (OUTPATIENT)
Dept: PSYCHIATRY | Age: 25
Setting detail: THERAPIES SERIES
Discharge: HOME | End: 2025-02-17
Payer: COMMERCIAL

## 2025-02-17 DIAGNOSIS — F33.1 MDD (MAJOR DEPRESSIVE DISORDER), RECURRENT EPISODE, MODERATE (HCC): Primary | ICD-10-CM

## 2025-02-17 PROCEDURE — 90853 GROUP PSYCHOTHERAPY: CPT

## 2025-02-17 NOTE — GROUP NOTE
Group Therapy Note    Date: 2/17/2025    Group Start Time: 11:15 AM  Group End Time: 12:15 PM  Group Topic: Cognitive Skills    Livia Butt        Group Therapy Note    Attendees: 6    Group members were given note cards and asked to write down reminders of healthy habits, positive affirmations, and values. Group members cards will then be laminated and tied together to be used as reminders.      Notes:  Anu attended group for the full duration. Anu completed her cards and shared with her peers. Anu remained engaged and interacted appropriately.     Status After Intervention:  Improved    Participation Level: Active Listener and Interactive    Participation Quality: Appropriate, Attentive, and Sharing      Speech:  normal      Thought Process/Content: Logical      Affective Functioning: Congruent      Mood: euthymic      Level of consciousness:  Alert, Oriented x4, and Attentive      Response to Learning: Able to verbalize current knowledge/experience      Endings: None Reported    Modes of Intervention: Socialization, Exploration, and Activity      Discipline Responsible: Behavorial Health Tech      Signature:  GINNA FLORES

## 2025-02-17 NOTE — GROUP NOTE
Group Therapy Note    Date: 2/17/2025    Group Start Time:  8:30 AM  Group End Time:  9:45 AM  Group Topic: Psychotherapy    [facility] [place] Livia Jose LPC    Group participants engaged in agenda-setting psychotherapy session. Participants identified their personal goals for group, focusing on the present moment, current stressors, and relationship dynamics. Participants worked to offer/accept additional perspectives and feedback from each other to help peers process their thoughts/feelings while also progressing toward identified goal.     Group Therapy Note    Attendees: 6       Patient's Goal:  \"Write out boundaries and plan discussion\"    Notes:  Patient was present throughout the group. Patient was able to share perspectives and participated in discussion. Patient shared recent updates within relationships and feeling like she's made impulsive decisions over the weekend. She explored potential boundaries that she'd like to set and prioritizing making healthy choices. Patient interacted with peers appropriately and accepted feedback.     Status After Intervention:  Improved    Participation Level: Active Listener and Interactive    Participation Quality: Appropriate, Attentive, Sharing, and Supportive      Speech:  normal      Thought Process/Content: Linear      Affective Functioning: Congruent      Mood: anxious      Level of consciousness:  Alert      Response to Learning: Able to verbalize current knowledge/experience      Endings: None Reported    Modes of Intervention: Support, Exploration, Clarifying, and Problem-solving      Discipline Responsible: /Counselor      Signature:  Livia Edmond LPC

## 2025-02-17 NOTE — GROUP NOTE
Group Therapy Note    Date: 2/17/2025    Group Start Time: 10:00 AM  Group End Time: 11:00 AM  Group Topic: Cognitive Skills    [facility] Livia Jose LPC    Group members learned about DBT components. Members learned about emotional regulation skills, including identifying emotion and intentionally choosing the \"opposite action\". Members identified recent emotion and practiced choosing opposite action to regulate emotion.     Group Therapy Note    Attendees: 6       Notes:  Patient was present throughout the group. Patient engaged in discussion/activity and interacted with peers appropriately.     Status After Intervention:  Improved    Participation Level: Active Listener and Interactive    Participation Quality: Appropriate, Attentive, Sharing, and Supportive      Speech:  normal      Thought Process/Content: Linear      Affective Functioning: Congruent      Mood: anxious      Level of consciousness:  Alert      Response to Learning: Able to verbalize current knowledge/experience and Able to verbalize/acknowledge new learning      Endings: None Reported    Modes of Intervention: Education, Support, Exploration, Clarifying, and Problem-solving      Discipline Responsible: /Counselor      Signature:  Livia Edmond LPC

## 2025-02-19 ENCOUNTER — APPOINTMENT (OUTPATIENT)
Dept: PSYCHIATRY | Age: 25
End: 2025-02-19
Payer: COMMERCIAL

## 2025-02-19 NOTE — BH NOTE
Outpatient Multidisciplinary Treatment Team Progress Note     Date of Review: 2/19/25     Outpatient Multidisciplinary Treatment Team met to discuss and review patient's progress in group and individual sessions.      Current Goal(s):   \"Write out boundaries and plan discussion\"      Progress Made Toward Goal(s): Anu is currently working on  managing family dynamics as well as setting boundaries. Anu has been attending groups and getting more comfortable sharing in groups and being vulnerable.      Admission Information:     Presenting Problem:  MDD   Treatment Start Date: 2/7/25  Tentative Discharge Date: 3/19/25

## 2025-02-20 ENCOUNTER — PATIENT MESSAGE (OUTPATIENT)
Dept: FAMILY MEDICINE CLINIC | Age: 25
End: 2025-02-20

## 2025-02-21 ENCOUNTER — HOSPITAL ENCOUNTER (OUTPATIENT)
Dept: PSYCHIATRY | Age: 25
Setting detail: THERAPIES SERIES
Discharge: HOME | End: 2025-02-21
Payer: COMMERCIAL

## 2025-02-21 VITALS
HEART RATE: 89 BPM | TEMPERATURE: 97.6 F | DIASTOLIC BLOOD PRESSURE: 66 MMHG | OXYGEN SATURATION: 98 % | SYSTOLIC BLOOD PRESSURE: 140 MMHG | RESPIRATION RATE: 18 BRPM

## 2025-02-21 DIAGNOSIS — F33.1 MODERATE EPISODE OF RECURRENT MAJOR DEPRESSIVE DISORDER (HCC): Primary | ICD-10-CM

## 2025-02-21 PROCEDURE — 90853 GROUP PSYCHOTHERAPY: CPT

## 2025-02-21 NOTE — GROUP NOTE
Group Therapy Note    Date: 2/21/2025    Group Start Time: 11:15 AM  Group End Time: 12:15 PM  Group Topic: Cognitive Skills    Livia Butt        Group Therapy Note    Attendees: 7    Group members were read a scenario from the \"[place] [employer] Activity. Group members were given 15 items and had to list the importance of each item in order individually. When finished, the members then came together and listed the items as a group. Writer read the group members the level of importance that the [employer] had decided. At the end members tallied up their numbers and rated their level of survival.      Notes:  nAu attended group for the full duration. Anu collaborated with the group. Anu remained engaged and interacted approrpiately with other members of the group.    Status After Intervention:  Improved    Participation Level: Active Listener and Interactive    Participation Quality: Appropriate, Attentive, and Sharing      Speech:  normal      Thought Process/Content: Logical      Affective Functioning: Congruent      Mood: euthymic      Level of consciousness:  Alert, Oriented x4, and Attentive      Response to Learning: Able to verbalize current knowledge/experience      Endings: None Reported    Modes of Intervention: Socialization, Exploration, and Activity      Discipline Responsible: Behavorial Health Tech      Signature:  GINNA FLORES

## 2025-02-21 NOTE — GROUP NOTE
Group Therapy Note    Date: 2/21/2025    Group Start Time: 10:00 AM  Group End Time: 11:00 AM  Group Topic: Cognitive Skills    [facility] [place] [facility]    Group members were able to engage in discussion to learn about radical acceptance. Members explored various situations in which they have tried to use appropriate skills but have been unsuccessful. Members were able to use radical acceptance skills to apply to personal situations. Members provided insight/feedback to each other and discussed challenges of radical acceptance.     Group Therapy Note    Attendees: 7       Notes:  Patient was present throughout the group. Patient engaged in activity/discussion and interacted with peers appropriately.     Status After Intervention:  Improved    Participation Level: Active Listener and Interactive    Participation Quality: Appropriate, Attentive, Sharing, and Supportive      Speech:  normal      Thought Process/Content: Linear      Affective Functioning: Congruent      Mood: anxious      Level of consciousness:  Alert      Response to Learning: Able to verbalize current knowledge/experience and Able to verbalize/acknowledge new learning      Endings: None Reported    Modes of Intervention: Education, Support, Exploration, Clarifying, and Problem-solving      Discipline Responsible: /Counselor      Signature:  Livia Edmond LPC

## 2025-02-21 NOTE — GROUP NOTE
Group Therapy Note    Date: 2/21/2025    Group Start Time:  8:30 AM  Group End Time:  9:45 AM  Group Topic: Psychotherapy    [facility] L[facility]    Group participants engaged in agenda-setting psychotherapy session. Participants identified their personal goals for group, focusing on the present moment, current stressors, and relationship dynamics. Participants worked to offer/accept additional perspectives and feedback from each other to help peers process their thoughts/feelings while also progressing toward identified goal.     Group Therapy Note    Attendees: 7       Patient's Goal:  Set a boundary from my past    Notes:   Patient was present throughout the group. Patient was able to share perspectives and participated in discussion. Patient shared past trauma with the group and reports feeling that she is having mood swings. Patient interacted with peers appropriately and accepted feedback.     Status After Intervention:  Improved    Participation Level: Active Listener and Interactive    Participation Quality: Appropriate, Attentive, Sharing, and Supportive      Speech:  normal      Thought Process/Content: Linear      Affective Functioning: Congruent      Mood: anxious      Level of consciousness:  Alert      Response to Learning: Able to verbalize current knowledge/experience and Able to verbalize/acknowledge new learning      Endings: None Reported    Modes of Intervention: Support, Exploration, Clarifying, and Problem-solving      Discipline Responsible: /Counselor      Signature:  Livia Edmond LPC

## 2025-02-24 ENCOUNTER — HOSPITAL ENCOUNTER (OUTPATIENT)
Dept: PSYCHIATRY | Age: 25
Setting detail: THERAPIES SERIES
Discharge: HOME | End: 2025-02-24
Payer: COMMERCIAL

## 2025-02-24 DIAGNOSIS — F33.1 MDD (MAJOR DEPRESSIVE DISORDER), RECURRENT EPISODE, MODERATE (HCC): Primary | ICD-10-CM

## 2025-02-24 PROCEDURE — 90853 GROUP PSYCHOTHERAPY: CPT

## 2025-02-24 PROCEDURE — 99215 OFFICE O/P EST HI 40 MIN: CPT | Performed by: PSYCHIATRY & NEUROLOGY

## 2025-02-24 RX ORDER — LAMOTRIGINE 25 MG/1
25 TABLET ORAL DAILY
Qty: 30 TABLET | Refills: 0 | Status: SHIPPED | OUTPATIENT
Start: 2025-02-24

## 2025-02-24 NOTE — GROUP NOTE
Group Therapy Note    Date: 2/24/2025    Group Start Time:  8:30 AM  Group End Time:  9:45 AM  Group Topic: Psychotherapy    [facility] Livia Jose LPC    Group participants engaged in agenda-setting psychotherapy session. Participants identified their personal goals for group, focusing on the present moment, current stressors, and relationship dynamics. Participants worked to offer/accept additional perspectives and feedback from each other to help peers process their thoughts/feelings while also progressing toward identified goal.     Group Therapy Note    Attendees: 8       Patient's Goal:  Go to the cemetary with Dad and brothers    Notes:  Patient was present throughout the group. Patient was able to share perspectives and participated in discussion. Patient shared recent family stressors and navigating the anniversary of her mother's death. Patient interacted with peers appropriately and accepted feedback.     Status After Intervention:  Improved    Participation Level: Active Listener and Interactive    Participation Quality: Appropriate, Attentive, Sharing, and Supportive      Speech:  normal      Thought Process/Content: Linear      Affective Functioning: Congruent      Mood: depressed      Level of consciousness:  Alert      Response to Learning: Able to verbalize current knowledge/experience      Endings: None Reported    Modes of Intervention: Support, Exploration, Clarifying, and Problem-solving      Discipline Responsible: /Counselor      Signature:  Livia Edmond LPC

## 2025-02-24 NOTE — GROUP NOTE
Group Therapy Note    Date: 2/24/2025    Group Start Time: 10:00 AM  Group End Time: 11:00 AM  Group Topic: Cognitive Skills    Livia Butt        Group Therapy Note    Attendees: 8    Group members were given a \"Love Language\" test and asked to complete it. The group members determined their love languages and shared them with one another. Group members engaged in conversation about the different love languages and how they use them to communicate in their relationships.      Notes:  Anu attended group for the full duration. Anu identified her love languages and shared them with the group. Anu remained engaged and interacted appropriately with her peers.      Status After Intervention:  Improved    Participation Level: Active Listener and Interactive    Participation Quality: Appropriate, Attentive, and Sharing      Speech:  normal      Thought Process/Content: Logical      Affective Functioning: Congruent      Mood: euthymic      Level of consciousness:  Alert, Oriented x4, and Attentive      Response to Learning: Able to verbalize current knowledge/experience      Endings: None Reported    Modes of Intervention: Socialization, Exploration, and Activity      Discipline Responsible: Behavorial Health Tech      Signature:  GINNA FLORES

## 2025-02-24 NOTE — BH NOTE
Outpatient 1:1     Date of Review: 2/24/25      Progress Made Toward Goal(s): Writer checked in with Anu for her 1:1. Anu shared that today is her mom's birthday and that she has been gone for 10 years. Anu shared that she feels the blues when anniversaries and holidays hit. Anu shared family dynamics and that she is trying to work through that and grief during her time in the program.      Admission Information:     Presenting Problem:  MDD   Treatment Start Date: 2/7/25  Tentative Discharge Date: 3/19/25

## 2025-02-24 NOTE — GROUP NOTE
Group Therapy Note    Date: 2/24/2025    Group Start Time: 11:15 AM  Group End Time: 12:15 PM  Group Topic: Cognitive Skills    Livia Butt        Group Therapy Note    Attendees: 8    Group members were given a  link for an attachment styles quiz. The group members determined their attachment styles and shared them with one another. Group members engaged in conversation about the different attachment styles and how they use them to communicate in their relationships.     Notes:  Anu attended group for the full duration. Anu identified her attachment styles and shared them with the group. Anu remained engaged and interacted appropriately with her peers.      Status After Intervention:  Improved    Participation Level: Active Listener and Interactive    Participation Quality: Appropriate, Attentive, and Sharing      Speech:  normal      Thought Process/Content: Logical      Affective Functioning: Congruent      Mood: euthymic      Level of consciousness:  Alert, Oriented x4, and Attentive      Response to Learning: Able to verbalize current knowledge/experience      Endings: None Reported    Modes of Intervention: Socialization, Exploration, and Activity      Discipline Responsible: Behavorial Health Tech      Signature:  GINNA FLORES

## 2025-02-25 NOTE — PROGRESS NOTES
Department of Psychiatry  Progress Note    Patient's chart was reviewed. Discussed with treatment team. Met with patient.     SUBJECTIVE:      Reports manic-like symptoms two weeks ago in which she engaged in high risk activities, was not sleeping, and felt an internal \"buzz.\" Ended up using cocaine and having unprotected sex.     Teams members report manic-like symptoms during group that week too    Says these episodes happen periodically. She has long periods of depression.    Her Prozac was increased to 40mg a few months ago.     Does not present manic today.    Discussed treatment options and agreed to reduce prozac to 20mg and start Lamictal.     ROS:   Patient has new complaints: no  Sleeping adequately:  Yes   Appetite adequate: Yes  Engaged in programming: Yes    OBJECTIVE:  VITALS:  stable.    Mental Status Examination:    Appearance: good hygiene   Behavior/Attitude toward examiner:  cooperative, attentive and fair eye contact  Speech:  hyperverbal   Mood:  \"ok\"  Affect:  Mood congruent   Thought processes:  Goal directed, linear, no FRANCI or gross disorganization  Thought Content: no SI, no HI, no delusions voiced, no obsessions  Perceptions: no AVH, not RTIS  Attention: attention span and concentration were intact to interview   Abstraction: intact  Cognition: Average VERONICA, Alert and oriented to person, place, time, and situation, recall intact  Insight: intact  Judgment: intact    Medication:  Current Outpatient Medications   Medication Sig Dispense Refill    FLUoxetine (PROZAC) 20 MG capsule Take 1 capsule by mouth daily 30 capsule 0    lamoTRIgine (LAMICTAL) 25 MG tablet Take 1 tablet by mouth daily 30 tablet 0    BREO ELLIPTA 100-25 MCG/ACT inhaler TAKE 1 PUFF BY MOUTH EVERY DAY 60 each 2    albuterol sulfate HFA (VENTOLIN HFA) 108 (90 Base) MCG/ACT inhaler Inhale 2 puffs into the lungs 4 times daily as needed for Wheezing 18 g 5     No current facility-administered medications for this encounter.

## 2025-02-26 ENCOUNTER — HOSPITAL ENCOUNTER (OUTPATIENT)
Dept: PSYCHIATRY | Age: 25
Setting detail: THERAPIES SERIES
Discharge: HOME | End: 2025-02-26
Payer: COMMERCIAL

## 2025-02-26 DIAGNOSIS — F33.1 MDD (MAJOR DEPRESSIVE DISORDER), RECURRENT EPISODE, MODERATE (HCC): Primary | ICD-10-CM

## 2025-02-26 DIAGNOSIS — F33.1 MODERATE EPISODE OF RECURRENT MAJOR DEPRESSIVE DISORDER (HCC): ICD-10-CM

## 2025-02-26 PROCEDURE — 90853 GROUP PSYCHOTHERAPY: CPT

## 2025-02-26 NOTE — GROUP NOTE
Group Therapy Note    Date: 2/26/2025    Group Start Time: 11:15 AM  Group End Time: 12:15 PM  Group Topic: Relapse Prevention    [facility] [place] [facility] Livia Jose LPC    Group members engaged in relapse prevention planning. Members were able to identify coping skills, early warning signs, and support systems. Group members discussed barriers in relapse prevention and how to educate our support systems on our needs.     Group Therapy Note    Attendees: 7       Notes:  Patient was present throughout the group. Patient engaged in discussion/activity and interacted with peers appropriately.      Status After Intervention:  Improved    Participation Level: Active Listener and Interactive    Participation Quality: Appropriate, Attentive, Sharing, and Supportive      Speech:  normal      Thought Process/Content: Logical  Linear      Affective Functioning: Congruent      Mood: depressed      Level of consciousness:  Alert      Response to Learning: Able to verbalize current knowledge/experience      Endings: None Reported    Modes of Intervention: Education, Support, Exploration, Clarifying, and Problem-solving      Discipline Responsible: /Counselor      Signature:  Livia Edmond LPC

## 2025-02-26 NOTE — GROUP NOTE
Group Therapy Note    Date: 2/26/2025    Group Start Time: 10:00 AM  Group End Time: 11:00 AM  Group Topic: Cognitive Skills    Livia Butt        Group Therapy Note    Attendees: 7    Group members were given a worksheet on \"E[employer]\" and asked to fill it out with their support systems in their life. When finished, group members shared their supports as well as different resources that have helped them in the past.        Notes: Anu attended group for the full duration. Anu shared her support systems as well as resources with the group. Anu remained engaged and interacted appropriately with her peers.     Status After Intervention:  Improved    Participation Level: Active Listener and Interactive    Participation Quality: Appropriate, Attentive, and Sharing      Speech:  normal      Thought Process/Content: Logical      Affective Functioning: Congruent      Mood: euthymic      Level of consciousness:  Alert, Oriented x4, and Attentive      Response to Learning: Able to verbalize current knowledge/experience      Endings: None Reported    Modes of Intervention: Socialization, Exploration, and Activity      Discipline Responsible: Behavorial Health Tech      Signature:  GINNA FLORES

## 2025-02-26 NOTE — BH NOTE
1:1    LPC observed patient to be crying in between groups. Throughout the 11:15 group, patient was quiet and somewhat withdrawn. Patient explained feeling that she did not see much progress and was feeling hopeless. She reports that it was recently her  mother's birthday and she's been focused on past trauma. LPC provided reassurance and discussed that progress is not always linear. Patient denies current suicidal thoughts and reports feeling a little better after the conversation. Patient plans to continue with treatment plan and requested potential individual therapy in conjunction with group.

## 2025-02-26 NOTE — GROUP NOTE
Group Therapy Note    Date: 2/26/2025    Group Start Time:  8:30 AM  Group End Time:  9:45 AM  Group Topic: Psychotherapy    [facility]    Kimberly Francisco LISW        Group Therapy Note    Attendees: 7   Group participants engaged in agenda-setting psychotherapy session. Participants identified their personal goals for group, focusing on the present moment, current stressors, and relationship dynamics. Participants worked to offer/accept additional perspectives and feedback from each other to help peers process their thoughts/feelings while also progressing toward identified goal.        Patient's Goal:  \"to get out of the house and reach out to a friend\"    Notes:  pt attended group for full duration. She was able to connect with group members. Pt shared that she feels that she has been in manic state for last 2 weeks with poor judgment and poor decisions and is now  spiraling down. She was able to feel some relief and hope that her medication is being changed and her recent medication was contributing to her being manic.     Status After Intervention:  Improved    Participation Level: Active Listener and Interactive    Participation Quality: Appropriate, Attentive, and Sharing      Speech:  hesitant      Thought Process/Content: Linear      Affective Functioning: Congruent      Mood: depressed      Level of consciousness:  Alert and Attentive      Response to Learning: Able to verbalize current knowledge/experience      Endings: None Reported    Modes of Intervention: Education, Support, Socialization, Exploration, Clarifying, and Problem-solving      Discipline Responsible: /Counselor      Signature:  ROBERT Savage

## 2025-02-28 ENCOUNTER — HOSPITAL ENCOUNTER (OUTPATIENT)
Dept: PSYCHIATRY | Age: 25
Setting detail: THERAPIES SERIES
Discharge: HOME | End: 2025-02-28
Payer: COMMERCIAL

## 2025-02-28 VITALS
TEMPERATURE: 97.8 F | DIASTOLIC BLOOD PRESSURE: 77 MMHG | OXYGEN SATURATION: 97 % | HEART RATE: 87 BPM | RESPIRATION RATE: 18 BRPM | SYSTOLIC BLOOD PRESSURE: 125 MMHG

## 2025-02-28 DIAGNOSIS — F33.1 MDD (MAJOR DEPRESSIVE DISORDER), RECURRENT EPISODE, MODERATE (HCC): Primary | ICD-10-CM

## 2025-02-28 PROCEDURE — 90853 GROUP PSYCHOTHERAPY: CPT

## 2025-02-28 NOTE — GROUP NOTE
Group Therapy Note    Date: 2/28/2025    Group Start Time: 10:00 AM  Group End Time: 11:00 AM  Group Topic: Cognitive Skills    [facility]    Group members discussed use of coping skills. Members identified when to use coping skills and the importance of the use. Members shared coping skills that have been beneficial for anxiety/depression/anger. Members made a personal list of potential coping skills.     Group Therapy Note    Attendees: 6       Notes:  Patient was present throughout the group. Patient engaged in discussion/activity and interacted with peers appropriately.     Status After Intervention:  Improved    Participation Level: Active Listener and Interactive    Participation Quality: Appropriate, Attentive, Sharing, and Supportive      Speech:  normal      Thought Process/Content: Linear      Affective Functioning: Congruent      Mood: anxious      Level of consciousness:  Alert      Response to Learning: Able to verbalize current knowledge/experience and Able to verbalize/acknowledge new learning      Endings: None Reported    Modes of Intervention: Education, Support, Exploration, and Clarifying      Discipline Responsible: /Counselor      Signature:  Livia Edmond LPC

## 2025-02-28 NOTE — GROUP NOTE
Group Therapy Note    Date: 2/28/2025    Group Start Time: 11:15 AM  Group End Time: 12:15 PM  Group Topic: Recreational    [facility] Livia Breen        Group Therapy Note    Attendees: 6    Patients engaged in multiple rounds of Apples to Apples. The goal of group was to stimulate mental activity, promote social interaction, and improve well-being.       Notes:  Anu attended group for majority of the time due to meeting with her provider. Anu was able to follow directions appropriately and interacted appropriately with her peers. Anu was able to give feedback to others responses.     Status After Intervention:  Improved    Participation Level: Active Listener and Interactive    Participation Quality: Appropriate, Attentive, and Sharing      Speech:  normal      Thought Process/Content: Logical      Affective Functioning: Congruent      Mood: euthymic      Level of consciousness:  Alert, Oriented x4, and Attentive      Response to Learning: Able to verbalize current knowledge/experience      Endings: None Reported    Modes of Intervention: Socialization, Exploration, and Activity      Discipline Responsible: Behavorial Health Tech      Signature:  GINNA FLORES

## 2025-02-28 NOTE — GROUP NOTE
Group Therapy Note    Date: 2/28/2025    Group Start Time:  8:30 AM  Group End Time:  9:45 AM  Group Topic: Psychotherapy    [facility] [place] [facility] Kimberly Hilario LISW        Group Therapy Note    Attendees: 6      Group participants engaged in agenda-setting psychotherapy session. Participants identified their personal goals for group, focusing on the present moment, current stressors, and relationship dynamics. Participants worked to offer/accept additional perspectives and feedback from each other to help peers process their thoughts/feelings while also progressing toward identified goal.        Patient's Goal:  \"to journal\"    Notes:  pt active in group discussion. She was able to share and accept feedback from other group members. Pt reported that she is feeling a lot better since starting medication change and feel she has hope.    Status After Intervention:  Improved    Participation Level: Active Listener and Interactive    Participation Quality: Appropriate, Attentive, Sharing, and Supportive      Speech:  normal      Thought Process/Content: Logical      Affective Functioning: Congruent      Mood: anxious      Level of consciousness:  Alert and Attentive      Response to Learning: Able to verbalize current knowledge/experience      Endings: None Reported    Modes of Intervention: Support, Socialization, Exploration, and Clarifying      Discipline Responsible: /Counselor      Signature:  ROBERT Savage

## 2025-02-28 NOTE — BH NOTE
Outpatient Multidisciplinary Treatment Team Progress Note     Date of Review: 2/26/25     Outpatient Multidisciplinary Treatment Team met to discuss and review patient's progress in group and individual sessions.      Current Goal(s):  \"to get out of the house and reach out to a friend\"      Progress Made Toward Goal(s): Anu is currently working on  managing family dynamics as well as setting boundaries. Anu has been attending groups and has continued to get more comfortable sharing in groups and being vulnerable.      Admission Information:     Presenting Problem:  MDD   Treatment Start Date: 2/7/25  Tentative Discharge Date: 3/19/25

## 2025-03-03 ENCOUNTER — HOSPITAL ENCOUNTER (OUTPATIENT)
Dept: PSYCHIATRY | Age: 25
Setting detail: THERAPIES SERIES
Discharge: HOME | End: 2025-03-03
Payer: COMMERCIAL

## 2025-03-03 NOTE — BH NOTE
Pt left VM stating she has a bat in her house, and the  is coming to get it out so she will not be in IOP today. This is pt's 1st absence.

## 2025-03-05 ENCOUNTER — HOSPITAL ENCOUNTER (OUTPATIENT)
Dept: PSYCHIATRY | Age: 25
Setting detail: THERAPIES SERIES
Discharge: HOME | End: 2025-03-05
Payer: COMMERCIAL

## 2025-03-05 DIAGNOSIS — F33.1 MODERATE EPISODE OF RECURRENT MAJOR DEPRESSIVE DISORDER (HCC): Primary | ICD-10-CM

## 2025-03-05 PROCEDURE — 90853 GROUP PSYCHOTHERAPY: CPT

## 2025-03-05 NOTE — GROUP NOTE
Group Therapy Note    Date: 3/5/2025    Group Start Time: 10:00 AM  Group End Time: 11:00 AM  Group Topic: Cognitive Skills    Livia Butt        Group Therapy Note    Attendees: 7    Group members engaged in the exercise \"Creating Connections.\" Group members were given a ball of yarn and were given various prompts. With the yarn, group members created a spider web that was known as the connection. The goal of the group was to show people the similarities and differences of one another, but they can still be connected even with differences.     Notes:  Anu attended group for the full duration. Anu completed the activity. Anu  remained engaged and interacted approrpiately with other members of the group.     Status After Intervention:  Improved    Participation Level: Active Listener and Interactive    Participation Quality: Appropriate, Attentive, and Sharing      Speech:  normal      Thought Process/Content: Logical      Affective Functioning: Congruent      Mood: euthymic      Level of consciousness:  Alert, Oriented x4, and Attentive      Response to Learning: Able to verbalize current knowledge/experience      Endings: None Reported    Modes of Intervention: Socialization, Exploration, and Activity      Discipline Responsible: Behavorial Health Tech      Signature:  GINNA FLORES

## 2025-03-05 NOTE — GROUP NOTE
Group Therapy Note    Date: 3/5/2025    Group Start Time: 11:15 AM  Group End Time: 12:15 PM  Group Topic: Cognitive Skills    Livia Butt        Group Therapy Note    Attendees: 7    Group members were given a handout on \"[place] Meditation\" and reviewed it. When finished, group members were given beads and asked to make their own meditation bracelet. The goal of rainbow meditation is to be able to use a form of guided visualization that uses the colors of the rainbow to promote relaxation, balance, and healing.     Notes:  Anu attended group for the full duration. Anu engaged in the activity. Anu remained engaged and interacted approrpiately with her peers.    Status After Intervention:  Improved    Participation Level: Active Listener and Interactive    Participation Quality: Appropriate, Attentive, and Sharing      Speech:  normal      Thought Process/Content: Logical      Affective Functioning: Congruent      Mood: euthymic      Level of consciousness:  Alert, Oriented x4, and Attentive      Response to Learning: Able to verbalize current knowledge/experience      Endings: None Reported    Modes of Intervention: Socialization, Exploration, and Activity      Discipline Responsible: Behavorial Health Tech      Signature:  GINNA FLORES

## 2025-03-05 NOTE — BH NOTE
Outpatient Multidisciplinary Treatment Team Progress Note     Date of Review: 3/5/25     Outpatient Multidisciplinary Treatment Team met to discuss and review patient's progress in group and individual sessions.      Current Goal(s):  \"To Be More Assertive\"      Progress Made Toward Goal(s): Anu is currently working on  managing family dynamics as well as setting boundaries. Anu has been attending groups and has continued to get more comfortable sharing in groups and being vulnerable.      Admission Information:     Presenting Problem:  MDD   Treatment Start Date: 2/7/25  Tentative Discharge Date: 3/19/25

## 2025-03-05 NOTE — GROUP NOTE
Group Therapy Note    Date: 3/5/2025    Group Start Time:  8:30 AM  Group End Time:  9:45 AM  Group Topic: Psychotherapy    [facility] [place] [facility] Kimberly Hilario LISW        Group Therapy Note    Attendees: 7     Group participants engaged in agenda-setting psychotherapy session. Participants identified their personal goals for group, focusing on the present moment, current stressors, and relationship dynamics. Participants worked to offer/accept additional perspectives and feedback from each other to help peers process their thoughts/feelings while also progressing toward identified goal.      Patient's Goal:  \"to be more assertive\"     Notes:  pt actively participated in group discussion. She gave and accepted feedback from other group members.  Pt reported that she has been able to be more assertive and that she is feeling better since her medication changes.     Status After Intervention:  Improved    Participation Level: Active Listener and Interactive    Participation Quality: Appropriate, Attentive, Sharing, and Supportive      Speech:  normal      Thought Process/Content: Logical      Affective Functioning: Congruent      Mood: anxious      Level of consciousness:  Alert and Attentive      Response to Learning: Able to verbalize current knowledge/experience      Endings: None Reported    Modes of Intervention: Support, Socialization, Exploration, and Clarifying      Discipline Responsible: /Counselor      Signature:  ROBERT Savage

## 2025-03-07 ENCOUNTER — HOSPITAL ENCOUNTER (OUTPATIENT)
Dept: PSYCHIATRY | Age: 25
Setting detail: THERAPIES SERIES
Discharge: HOME | End: 2025-03-07
Payer: COMMERCIAL

## 2025-03-07 VITALS
RESPIRATION RATE: 17 BRPM | HEART RATE: 80 BPM | TEMPERATURE: 97.7 F | OXYGEN SATURATION: 98 % | DIASTOLIC BLOOD PRESSURE: 63 MMHG | SYSTOLIC BLOOD PRESSURE: 133 MMHG

## 2025-03-07 DIAGNOSIS — F33.1 MDD (MAJOR DEPRESSIVE DISORDER), RECURRENT EPISODE, MODERATE (HCC): Primary | ICD-10-CM

## 2025-03-07 PROCEDURE — 90853 GROUP PSYCHOTHERAPY: CPT

## 2025-03-07 NOTE — GROUP NOTE
Group Therapy Note    Date: 3/7/2025    Group Start Time:  8:30 AM  Group End Time:  9:45 AM  Group Topic: Psychotherapy    [facility] [place] [facility] Kimberly Hilario LISW        Group Therapy Note    Attendees: 6     Group participants engaged in agenda-setting psychotherapy session. Participants identified their personal goals for group, focusing on the present moment, current stressors, and relationship dynamics. Participants worked to offer/accept additional perspectives and feedback from each other to help peers process their thoughts/feelings while also progressing toward identified goal.     Patient's Goal:  \"to practice radical acceptance\"    Notes:  pt actively participated in group discussion, connected with group members and gave/accepted feedback from others. Pt felt she met her goal by talking about her relationships and accepting what others are able to give and acknowledge their limitations.     Status After Intervention:  Improved    Participation Level: Active Listener and Interactive    Participation Quality: Appropriate, Attentive, Sharing, and Supportive      Speech:  normal      Thought Process/Content: Logical      Affective Functioning: Congruent      Mood: anxious      Level of consciousness:  Alert and Attentive      Response to Learning: Able to verbalize current knowledge/experience      Endings: None Reported    Modes of Intervention: Support, Socialization, Exploration, and Clarifying      Discipline Responsible: /Counselor      Signature:  ROBERT Savage

## 2025-03-07 NOTE — GROUP NOTE
Group Therapy Note    Date: 3/7/2025    Group Start Time: 11:15 AM  Group End Time: 12:15 PM  Group Topic: Recreational    [facility] Livia Breen        Group Therapy Note    Attendees: 6    Group engaged in multiple rounds of Scattergories. Group members were given a letter and 12 categories. The categories had to start with the letter given. Group members were split into two groups and were timed to complete the 12 categories.      Notes:  Anu attended group for the full duration. Anu completed the activity. Anu remained engaged and interacted approrpiately with peers.    Status After Intervention:  Improved    Participation Level: Active Listener and Interactive    Participation Quality: Appropriate and Attentive      Speech:  normal      Thought Process/Content: Logical      Affective Functioning: Congruent      Mood: euthymic      Level of consciousness:  Alert      Response to Learning: Able to verbalize current knowledge/experience      Endings: None Reported    Modes of Intervention: Socialization, Exploration, and Activity      Discipline Responsible: Behavorial Health Tech      Signature:  GINNA FLORES

## 2025-03-07 NOTE — GROUP NOTE
Group Therapy Note    Date: 3/7/2025    Group Start Time: 10:00 AM  Group End Time: 11:00 AM  Group Topic: Cognitive Skills    [facility] Livia Jose LPC    Group members engaged in discussion regarding grief. Members were able to process emotions and discuss what was helpful in their grieving period. Members provided insight and feedback amongst the group.      Group Therapy Note    Attendees: 6       Notes:  Patient was present throughout the group. Patient engaged in discussion and interacted with peers appropriately.      Status After Intervention:  Improved    Participation Level: Active Listener and Interactive    Participation Quality: Appropriate, Attentive, Sharing, and Supportive      Speech:  normal      Thought Process/Content: Linear      Affective Functioning: Congruent      Mood: anxious      Level of consciousness:  Alert      Response to Learning: Able to verbalize current knowledge/experience and Able to verbalize/acknowledge new learning      Endings: None Reported    Modes of Intervention: Support, Exploration, Clarifying, and Problem-solving      Discipline Responsible: /Counselor      Signature:  Livia Edmond LPC

## 2025-03-10 ENCOUNTER — HOSPITAL ENCOUNTER (OUTPATIENT)
Dept: PSYCHIATRY | Age: 25
Setting detail: THERAPIES SERIES
Discharge: HOME | End: 2025-03-10
Payer: COMMERCIAL

## 2025-03-10 VITALS
OXYGEN SATURATION: 99 % | HEART RATE: 70 BPM | TEMPERATURE: 97.7 F | RESPIRATION RATE: 17 BRPM | DIASTOLIC BLOOD PRESSURE: 78 MMHG | SYSTOLIC BLOOD PRESSURE: 127 MMHG

## 2025-03-10 DIAGNOSIS — F33.1 MDD (MAJOR DEPRESSIVE DISORDER), RECURRENT EPISODE, MODERATE (HCC): Primary | ICD-10-CM

## 2025-03-10 PROCEDURE — 99215 OFFICE O/P EST HI 40 MIN: CPT | Performed by: PSYCHIATRY & NEUROLOGY

## 2025-03-10 PROCEDURE — 90853 GROUP PSYCHOTHERAPY: CPT

## 2025-03-10 RX ORDER — LAMOTRIGINE 25 MG/1
50 TABLET ORAL 2 TIMES DAILY
Qty: 120 TABLET | Refills: 0 | Status: SHIPPED | OUTPATIENT
Start: 2025-03-10 | End: 2025-04-09

## 2025-03-10 NOTE — BH NOTE
Pt did not show up for IOP. Writer able to reach by phone. Pt states she overslept and will be here for next group.

## 2025-03-10 NOTE — GROUP NOTE
Group Therapy Note    Date: 3/10/2025    Group Start Time: 11:15 AM  Group End Time: 12:15 PM  Group Topic: Cognitive Skills    Livia Butt        Group Therapy Note    Attendees: 7    Group members completed a Personality Traits quiz and then were asked to share their results with one another. When finished, group members then completed a handout where they explored their identities and ranked their identities and what it means to them.      Notes:   Anu attended group for the full duration. Anu completed the prompts and shared with her peers. Anu remained engaged and interacted approrpiately.     Status After Intervention:  Improved    Participation Level: Active Listener and Interactive    Participation Quality: Appropriate, Attentive, and Sharing      Speech:  normal      Thought Process/Content: Logical      Affective Functioning: Congruent      Mood: euthymic      Level of consciousness:  Alert, Oriented x4, and Attentive      Response to Learning: Able to verbalize current knowledge/experience      Endings: None Reported    Modes of Intervention: Socialization, Exploration, and Activity      Discipline Responsible: Behavorial Health Tech      Signature:  GINNA FLORES

## 2025-03-10 NOTE — GROUP NOTE
Group Therapy Note    Date: 3/10/2025    Group Start Time: 10:00 AM  Group End Time: 11:00 AM  Group Topic: Cognitive Skills    Livia Butt        Group Therapy Note    Attendees: 7    Group members were given a handout on Personal Values and asked to complete 5 values for each category. When finished, group members completed a brain dump and followed prompts on listing out the things they can and can't control within their brain dumps.      Notes:   Anu attended group for the full duration. Anu completed the prompts and shared with her peers. Anu remained engaged and interacted approrpiately.     Status After Intervention:  Improved    Participation Level: Active Listener and Interactive    Participation Quality: Appropriate, Attentive, and Sharing      Speech:  normal      Thought Process/Content: Logical      Affective Functioning: Congruent      Mood: euthymic      Level of consciousness:  Alert, Oriented x4, and Attentive      Response to Learning: Able to verbalize current knowledge/experience      Endings: None Reported    Modes of Intervention: Socialization, Exploration, and Activity      Discipline Responsible: Behavorial Health Tech      Signature:  GINNA FLORES

## 2025-03-11 NOTE — PROGRESS NOTES
symptoms and some of the things she can do to set a good foundation for mental health including sleep hygiene and daily exercise.      4. On initial visit, safety plan discussed at length including presenting to the nearest ED or calling 911 if her SI gets worse or she develops intent to self-harm.    5. Follow-up with me next week.     Total time with patient was 50 minutes and more than 50 % of that time was spent counseling the patient on their symptoms, treatment, and expected goals.     Yonny Martin MD

## 2025-03-12 ENCOUNTER — HOSPITAL ENCOUNTER (OUTPATIENT)
Dept: PSYCHIATRY | Age: 25
Setting detail: THERAPIES SERIES
Discharge: HOME | End: 2025-03-12
Payer: COMMERCIAL

## 2025-03-12 DIAGNOSIS — F33.1 MDD (MAJOR DEPRESSIVE DISORDER), RECURRENT EPISODE, MODERATE (HCC): Primary | ICD-10-CM

## 2025-03-12 PROCEDURE — 90853 GROUP PSYCHOTHERAPY: CPT

## 2025-03-12 NOTE — GROUP NOTE
Group Therapy Note    Date: 3/12/2025    Group Start Time: 11:15 AM  Group End Time: 12:15 PM  Group Topic: Cognitive Skills    Livia Butt        Group Therapy Note    Attendees: 3    Group engaged in multiple rounds of Scattergories. Group members were given a letter and 12 categories. The categories had to start with the letter given. Group members were split into two groups and were timed to complete the 12 categories.      Notes:  Anu attended group for the full duration. Anu completed the activity. Anu remained engaged and interacted approrpiately with her peers.     Status After Intervention:  Improved    Participation Level: Active Listener and Interactive    Participation Quality: Appropriate, Attentive, and Sharing      Speech:  normal      Thought Process/Content: Logical      Affective Functioning: Congruent      Mood: euthymic      Level of consciousness:  Alert, Oriented x4, and Attentive      Response to Learning: Able to verbalize current knowledge/experience      Endings: None Reported    Modes of Intervention: Socialization, Exploration, and Activity      Discipline Responsible: Behavorial Health Tech      Signature:  GINNA FLORES

## 2025-03-12 NOTE — GROUP NOTE
Group Therapy Note    Date: 3/12/2025    Group Start Time:  8:30 AM  Group End Time:  9:45 AM  Group Topic: Psychotherapy    [facility] [place] [facility] Livia Jose LPC    Group participants engaged in agenda-setting psychotherapy session. Participants identified their personal goals for group, focusing on the present moment, current stressors, and relationship dynamics. Participants worked to offer/accept additional perspectives and feedback from each other to help peers process their thoughts/feelings while also progressing toward identified goal.     Group Therapy Note    Attendees: 4       Patient's Goal:  Stay in the routine, focus on positives    Notes:  Patient was present throughout the group. Patient was able to share perspectives and participated in discussion. Patient shared recent progress and feeling more stable. Patient interacted with peers appropriately and accepted feedback.        Status After Intervention:  Improved    Participation Level: Active Listener and Interactive    Participation Quality: Appropriate, Attentive, Sharing, and Supportive      Speech:  normal      Thought Process/Content: Linear      Affective Functioning: Congruent      Mood: euthymic      Level of consciousness:  Alert      Response to Learning: Able to verbalize current knowledge/experience and Able to verbalize/acknowledge new learning      Endings: None Reported    Modes of Intervention: Support, Exploration, Clarifying, and Problem-solving      Discipline Responsible: /Counselor      Signature:  Livia Edmond LPC

## 2025-03-12 NOTE — GROUP NOTE
Group Therapy Note    Date: 3/12/2025    Group Start Time: 10:00 AM  Group End Time: 11:00 AM  Group Topic: Cognitive Skills    [facility] Livia Jose LPC    Group members engaged in activity to write a \"letter to my past self\". Group members were able to share their letters, process successes and discuss motivation going forward. Members were able to provide support and share insight with each other. Members were able to reinforce successes within one another and enhance relationships/support.     Group Therapy Note    Attendees: 3       Notes:  Patient was present throughout the group. Patient engaged in discussion/activity and interacted with peers appropriately.      Status After Intervention:  Improved    Participation Level: Active Listener and Interactive    Participation Quality: Appropriate, Attentive, Sharing, and Supportive      Speech:  normal      Thought Process/Content: Linear      Affective Functioning: Congruent      Mood: euthymic      Level of consciousness:  Alert      Response to Learning: Able to verbalize current knowledge/experience and Able to verbalize/acknowledge new learning      Endings: None Reported    Modes of Intervention: Support, Exploration, Clarifying, and Activity      Discipline Responsible: /Counselor      Signature:  Livia Edmond LPC

## 2025-03-12 NOTE — BH NOTE
Outpatient Multidisciplinary Treatment Team Progress Note     Date of Review: 3/12/25     Outpatient Multidisciplinary Treatment Team met to discuss and review patient's progress in group and individual sessions.      Current Goal(s):  \"to practice radical acceptance\"      Progress Made Toward Goal(s): Anu is currently working on  managing family dynamics as well as setting boundaries. Anu has been attending groups and has continued to get more comfortable sharing in groups and being vulnerable.      Admission Information:     Presenting Problem:  MDD   Treatment Start Date: 2/7/25  Tentative Discharge Date: 3/19/25

## 2025-03-14 ENCOUNTER — HOSPITAL ENCOUNTER (OUTPATIENT)
Dept: PSYCHIATRY | Age: 25
Setting detail: THERAPIES SERIES
Discharge: HOME | End: 2025-03-14
Payer: COMMERCIAL

## 2025-03-14 DIAGNOSIS — F33.1 MDD (MAJOR DEPRESSIVE DISORDER), RECURRENT EPISODE, MODERATE (HCC): Primary | ICD-10-CM

## 2025-03-14 PROCEDURE — 90853 GROUP PSYCHOTHERAPY: CPT

## 2025-03-14 NOTE — GROUP NOTE
Group Therapy Note    Date: 3/14/2025    Group Start Time: 10:00 AM  Group End Time: 11:00 AM  Group Topic: Recreational    [facility] Livia Breen        Group Therapy Note    Attendees: 7    Group members watched the movie \"[place]icolette\" and engaged in 1:1 sessions with their treatment team. The goal of the movie was to show resilience and perseverance.      Notes:    Anu attended group for the full duration. Anu remained engaged and interacted appropriately with other members of the group.     Status After Intervention:  Improved    Participation Level: Active Listener and Interactive    Participation Quality: Appropriate, Attentive, and Sharing      Speech:  normal      Thought Process/Content: Logical      Affective Functioning: Congruent      Mood: euthymic      Level of consciousness:  Alert, Oriented x4, and Attentive      Response to Learning: Able to verbalize current knowledge/experience      Endings: None Reported    Modes of Intervention: Socialization, Exploration, and Activity      Discipline Responsible: Behavorial Health Tech      Signature:  GINNA FLORES

## 2025-03-14 NOTE — GROUP NOTE
Group Therapy Note    Date: 3/14/2025    Group Start Time: 11:15 AM  Group End Time: 12:15 PM  Group Topic: Recreational    [facility] Livia Breen        Group Therapy Note    Attendees: 7    Group members continued watching the movie \"[place]\" and engaged in 1:1 sessions with their treatment team. The goal of the movie was to show resilience and perseverance.         Notes:  Anu attended group for majority of the time due to meeting with a member of her treatment team. Anu remained engaged and interacted approrpiately with peers.     Status After Intervention:  Improved    Participation Level: Active Listener and Interactive    Participation Quality: Appropriate, Attentive, and Sharing      Speech:  normal      Thought Process/Content: Logical      Affective Functioning: Congruent      Mood: euthymic      Level of consciousness:  Alert, Oriented x4, and Attentive      Response to Learning: Able to verbalize current knowledge/experience      Endings: None Reported    Modes of Intervention: Socialization, Exploration, and Activity      Discipline Responsible: Behavorial Health Tech      Signature:  GINNA FLORES

## 2025-03-15 NOTE — GROUP NOTE
Group Therapy Note    Date: 3/14/2025    Group Start Time:  8:30 AM  Group End Time:  9:45 AM  Group Topic: Psychotherapy    [facility] [place] OP Livia Jose LPC    Group participants engaged in agenda-setting psychotherapy session. Participants identified their personal goals for group, focusing on the present moment, current stressors, and relationship dynamics. Participants worked to offer/accept additional perspectives and feedback from each other to help peers process their thoughts/feelings while also progressing toward identified goal.     Group Therapy Note    Attendees: 8       Patient's Goal:  \"keep the momentum going\"    Notes:   Patient was present throughout the group. Patient was able to share perspectives and participated in discussion. Patient discussed recent improvements in mood and stability. Patient explored anxieties related to returning to work. Patient interacted with peers appropriately and accepted feedback.     Status After Intervention:  Improved    Participation Level: Active Listener and Interactive    Participation Quality: Appropriate, Attentive, Sharing, and Supportive      Speech:  normal      Thought Process/Content: Linear      Affective Functioning: Congruent      Mood: euthymic      Level of consciousness:  Alert      Response to Learning: Able to verbalize current knowledge/experience and Able to verbalize/acknowledge new learning      Endings: None Reported    Modes of Intervention: Support, Exploration, Clarifying, and Problem-solving      Discipline Responsible: /Counselor      Signature:  Livia Edmond LPC

## 2025-03-15 NOTE — BH NOTE
1:1    LPC met with patient 1:1 to check in after psychotherapy. During psychotherapy, another peer shared emotions related to a recent death in her family. LPC wanted to ensure other patient's were able to process. Patient shared feeling proud of recent improvement. She reports feeling anxious about returning to work but feels ready. We discussed relapse prevention and support groups to join. No other concerns at this time.

## 2025-03-17 ENCOUNTER — HOSPITAL ENCOUNTER (OUTPATIENT)
Dept: PSYCHIATRY | Age: 25
Setting detail: THERAPIES SERIES
Discharge: HOME | End: 2025-03-17
Payer: COMMERCIAL

## 2025-03-17 DIAGNOSIS — F33.1 MDD (MAJOR DEPRESSIVE DISORDER), RECURRENT EPISODE, MODERATE (HCC): Primary | ICD-10-CM

## 2025-03-17 PROCEDURE — 90853 GROUP PSYCHOTHERAPY: CPT

## 2025-03-17 NOTE — GROUP NOTE
Group Therapy Note    Date: 3/17/2025    Group Start Time: 10:00 AM  Group End Time: 11:00 AM  Group Topic: Relapse Prevention    [facility] OP Livia Jose LPC    Group members engaged in creating relapse prevention plan. Members explored stages of change, somatic symptoms and support systems. Members were able to identify coping skills and were able to share resources within the group.     Group Therapy Note    Attendees: 7     Notes:  Patient was present throughout the group. Patient engaged in discussion/activity and interacted with peers appropriately.      Status After Intervention:  Improved    Participation Level: Active Listener and Interactive    Participation Quality: Appropriate, Attentive, Sharing, and Supportive      Speech:  normal      Thought Process/Content: Logical      Affective Functioning: Congruent      Mood: euthymic      Level of consciousness:  Alert      Response to Learning: Able to verbalize current knowledge/experience and Able to verbalize/acknowledge new learning      Endings: None Reported    Modes of Intervention: Education, Support, Exploration, Clarifying, and Problem-solving      Discipline Responsible: /Counselor      Signature:  Livia Edmond LPC

## 2025-03-17 NOTE — GROUP NOTE
Group Therapy Note    Date: 3/17/2025    Group Start Time:  8:30 AM  Group End Time:  9:45 AM  Group Topic: Psychotherapy    [facility] [place] [facility] Livia Jose LPC    Group participants engaged in agenda-setting psychotherapy session. Participants identified their personal goals for group, focusing on the present moment, current stressors, and relationship dynamics. Participants worked to offer/accept additional perspectives and feedback from each other to help peers process their thoughts/feelings while also progressing toward identified goal.     Group Therapy Note    Attendees: 6       Patient's Goal:  \"remain positive, smile\"    Notes:  Patient was present throughout the group. Patient was able to share perspectives and participated in discussion. Patient focused on positive improvements and plans to maintain routine/boundaries. Patient interacted with peers appropriately and accepted feedback.     Status After Intervention:  Improved    Participation Level: Active Listener and Interactive    Participation Quality: Appropriate, Attentive, Sharing, and Supportive      Speech:  normal      Thought Process/Content: Logical      Affective Functioning: Congruent      Mood: euthymic      Level of consciousness:  Alert      Response to Learning: Able to verbalize current knowledge/experience and Able to verbalize/acknowledge new learning      Endings: None Reported    Modes of Intervention: Support, Exploration, Clarifying, and Problem-solving      Discipline Responsible: /Counselor      Signature:  Livia Edmond LPC

## 2025-03-17 NOTE — GROUP NOTE
Group Therapy Note    Date: 3/17/2025    Group Start Time: 11:15 AM  Group End Time: 12:15 PM  Group Topic: Cognitive Skills    Livia Butt        Group Therapy Note    Attendees: 7    Group members were given a handout \"Guidelines for better sleep\" and it provided rules such as: Taking care of your body, Physical exercise, sleeping only at night-time, having a regular bedtime routine, and coping with bad dreams, making bedroom a pleasant place to be, and then tips on how to achieve all of these rules. Group members were given sleep masks, aroma tabs, and ear plugs. Group members created bags to place all of their items in.     Notes:  Anu attended group for the full duration. Anu engaged in the conversation with her peers and provided tips for sleep. Anu remained engaged and interacted approrpiately.     Status After Intervention:  Improved    Participation Level: Active Listener and Interactive    Participation Quality: Appropriate, Attentive, and Sharing      Speech:  normal      Thought Process/Content: Logical      Affective Functioning: Congruent      Mood: euthymic      Level of consciousness:  Alert, Oriented x4, and Attentive      Response to Learning: Able to verbalize current knowledge/experience      Endings: None Reported    Modes of Intervention: Socialization, Exploration, and Activity      Discipline Responsible: Behavorial Health Tech      Signature:  GINNA FLORES

## 2025-03-19 ENCOUNTER — HOSPITAL ENCOUNTER (OUTPATIENT)
Dept: PSYCHIATRY | Age: 25
Setting detail: THERAPIES SERIES
Discharge: HOME | End: 2025-03-19
Payer: COMMERCIAL

## 2025-03-19 VITALS
DIASTOLIC BLOOD PRESSURE: 70 MMHG | SYSTOLIC BLOOD PRESSURE: 124 MMHG | TEMPERATURE: 97.7 F | OXYGEN SATURATION: 97 % | HEART RATE: 79 BPM | RESPIRATION RATE: 17 BRPM

## 2025-03-19 DIAGNOSIS — F33.1 MDD (MAJOR DEPRESSIVE DISORDER), RECURRENT EPISODE, MODERATE (HCC): Primary | ICD-10-CM

## 2025-03-19 PROCEDURE — 90853 GROUP PSYCHOTHERAPY: CPT

## 2025-03-19 PROCEDURE — 99215 OFFICE O/P EST HI 40 MIN: CPT | Performed by: PSYCHIATRY & NEUROLOGY

## 2025-03-19 NOTE — TRANSITION OF CARE
a tobacco user  and needs referral for tobacco cessation? No  Patient referred to the following for tobacco cessation with an appointment? No  Patient was offered medication to assist with tobacco cessation at discharge? No    Alcohol/Substance Abuse Discharge Plan:   Does the patient have a history of substance/alcohol abuse and requires a referral for treatment? No  Patient referred to the following for substance/alcohol abuse treatment with an appointment? No  Patient was offered medication to assist with substance/alcohol abuse cessation at discharge? No      Patient discharged to: Home; Transition record discussed with patient/caregiver and provided this record in hard copy or electronically

## 2025-03-19 NOTE — GROUP NOTE
Group Therapy Note    Date: 3/19/2025    Group Start Time: 10:00 AM  Group End Time: 11:00 AM  Group Topic: Cognitive Skills    Livia Butt        Group Therapy Note    Attendees: 6    Group members were asked to trace their hands. Group members then were asked to write a compliment in each group member's hand. When finished, group members were asked to look at the comments and explain the compliments. The goal of group was to promote social interaction, and improve well-being.     Notes:  Anu attended group for the full duration. Anu completed the activity. Anu remained engaged and interacted approrpiately with peers.    Status After Intervention:  Improved    Participation Level: Active Listener and Interactive    Participation Quality: Appropriate      Speech:  normal      Thought Process/Content: Logical      Affective Functioning: Congruent      Mood: euthymic      Level of consciousness:  Alert, Oriented x4, and Attentive      Response to Learning: Able to verbalize current knowledge/experience      Endings: None Reported    Modes of Intervention: Socialization, Exploration, and Activity      Discipline Responsible: Behavorial Health Tech      Signature:  GINNA FLORES

## 2025-03-19 NOTE — GROUP NOTE
Group Therapy Note    Date: 3/19/2025    Group Start Time: 11:15 AM  Group End Time: 12:15 PM  Group Topic: Recreational    [facility] OP Livia Breen        Group Therapy Note    Attendees: 6    Group members broke into teams and engaged in multiple rounds of \"Heads up.\"  The goal of group was to evoke memories, stimulate mental activity, promote social interaction, and improve well-being.     Notes:  Anu attended group for the full duration. Anu completed the activity. Anu remained engaged and interacted approrpiately with peers.    Status After Intervention:  Improved    Participation Level: Active Listener and Interactive    Participation Quality: Appropriate, Attentive, and Sharing      Speech:  normal      Thought Process/Content: Logical      Affective Functioning: Congruent      Mood: euthymic      Level of consciousness:  Alert, Oriented x4, and Attentive      Response to Learning: Able to verbalize current knowledge/experience      Endings: None Reported    Modes of Intervention: Socialization, Exploration, and Activity      Discipline Responsible: Behavorial Health Tech      Signature:  GINNA FLORES

## 2025-03-19 NOTE — GROUP NOTE
Group Therapy Note    Date: 3/19/2025    Group Start Time:  8:30 AM  Group End Time:  9:45 AM  Group Topic: Psychotherapy    [facility] [place] BHKimberly Hart LISW        Group Therapy Note    Attendees: 6   Group participants engaged in agenda-setting psychotherapy session. Participants identified their personal goals for group, focusing on the present moment, current stressors, and relationship dynamics. Participants worked to offer/accept additional perspectives and feedback from each other to help peers process their thoughts/feelings while also progressing toward identified goal.      Patient's Goal:  \"to be in the present moment\"     Notes:  pt attended group for the full duration. She actively participated in group discussion and gave feedback to other group members. Pt able to stay in present moment and shared that she has a little anxiety about today being last day of program but feels ready.     Status After Intervention:  Improved    Participation Level: Active Listener and Interactive    Participation Quality: Appropriate, Attentive, Sharing, and Supportive      Speech:  normal      Thought Process/Content: Logical      Affective Functioning: Congruent      Mood: anxious      Level of consciousness:  Alert and Attentive      Response to Learning: Able to verbalize current knowledge/experience, Able to verbalize/acknowledge new learning, and Progressing to goal      Endings: None Reported    Modes of Intervention: Support, Socialization, Exploration, and Clarifying      Discipline Responsible: /Counselor      Signature:  ROBERT Savage

## 2025-03-20 ENCOUNTER — PATIENT MESSAGE (OUTPATIENT)
Dept: FAMILY MEDICINE CLINIC | Age: 25
End: 2025-03-20

## 2025-03-20 DIAGNOSIS — F33.2 SEVERE EPISODE OF RECURRENT MAJOR DEPRESSIVE DISORDER, WITHOUT PSYCHOTIC FEATURES (HCC): Primary | ICD-10-CM

## 2025-03-20 DIAGNOSIS — F50.89 BINGE-PURGE BEHAVIOR: ICD-10-CM

## 2025-03-20 DIAGNOSIS — Z86.59 HISTORY OF SUICIDAL IDEATION: ICD-10-CM

## 2025-03-20 NOTE — PROGRESS NOTES
Department of Psychiatry  Discharge Progress Note    Patient's chart was reviewed. Discussed with treatment team. Met with patient.     SUBJECTIVE:      Doing well.    Has implemented a new daily routine.    No-longer having mood swings and feels she's making better decisions.    Tolerating increased dose of Lamictal well.    Active in groups.    No thoughts of self-harm or suicide.     ROS:   Patient has new complaints: no  Sleeping adequately:  Yes   Appetite adequate: Yes  Engaged in programming: Yes    OBJECTIVE:  VITALS:  stable.    Mental Status Examination:    Appearance: good hygiene   Behavior/Attitude toward examiner:  cooperative, attentive and fair eye contact  Speech:  less hyperverbal   Mood:  \"much better\"  Affect:  Mood congruent   Thought processes:  Goal directed, linear, no FRANCI or gross disorganization  Thought Content: no SI, no HI, no delusions voiced, no obsessions  Perceptions: no AVH, not RTIS  Attention: attention span and concentration were intact to interview   Abstraction: intact  Cognition: Average VERONICA, Alert and oriented to person, place, time, and situation, recall intact  Insight: intact  Judgment: intact    Medication:  Current Outpatient Medications   Medication Sig Dispense Refill    FLUoxetine (PROZAC) 20 MG capsule Take 1 capsule by mouth daily 30 capsule 0    lamoTRIgine (LAMICTAL) 25 MG tablet Take 2 tablets by mouth 2 times daily 120 tablet 0    BREO ELLIPTA 100-25 MCG/ACT inhaler TAKE 1 PUFF BY MOUTH EVERY DAY 60 each 2    albuterol sulfate HFA (VENTOLIN HFA) 108 (90 Base) MCG/ACT inhaler Inhale 2 puffs into the lungs 4 times daily as needed for Wheezing 18 g 5     No current facility-administered medications for this encounter.     Formulation: domiciled, never-, and employed 26yo with a history of multiple psychiatric diagnoses who was referred to our IOP by her PCP with low mood and chronic suicidal thoughts.     She was admitted to our [facility] for further evaluation

## 2025-03-21 NOTE — BH NOTE
Outpatient Multidisciplinary Treatment Team Progress Note     Date of Review: 3/19/25     Outpatient Multidisciplinary Treatment Team met to discuss and review patient's progress in group and individual sessions.      Current Goal(s):  \"keep the momentum going\"      Progress Made Toward Goal(s): Anu is currently working on managing family dynamics as well as setting boundaries. Anu has been attending groups and has continued to get more comfortable sharing in groups and being vulnerable. Anu explored progress and is excited to finish the program and re-start work. She was able to focus on recent improvement and plans to continue therapy individually after IOP.      Admission Information:     Presenting Problem:  MDD   Treatment Start Date: 2/7/25  Tentative Discharge Date: 3/19/25

## 2025-03-31 ENCOUNTER — PATIENT MESSAGE (OUTPATIENT)
Dept: FAMILY MEDICINE CLINIC | Age: 25
End: 2025-03-31

## 2025-03-31 RX ORDER — LAMOTRIGINE 25 MG/1
50 TABLET ORAL 2 TIMES DAILY
Qty: 120 TABLET | Refills: 0 | Status: SHIPPED | OUTPATIENT
Start: 2025-03-31 | End: 2025-04-02 | Stop reason: DRUGHIGH

## 2025-04-01 NOTE — TELEPHONE ENCOUNTER
Per patient she is now taking 200 mg total a day, per Dr. Martin's note patient was to increase to 75 mg BID.    Please advise.

## 2025-04-01 NOTE — TELEPHONE ENCOUNTER
Pt requesting Lamictal 100 mg BID. Dr. Martin approved. Writer called in #[phone] to [facility] at [phone].

## 2025-04-02 RX ORDER — LAMOTRIGINE 200 MG/1
200 TABLET ORAL DAILY
Qty: 90 TABLET | Refills: 1 | Status: SHIPPED | OUTPATIENT
Start: 2025-04-02

## 2025-04-19 DIAGNOSIS — J45.30 MILD PERSISTENT ASTHMA WITHOUT COMPLICATION: ICD-10-CM

## 2025-04-19 DIAGNOSIS — R06.2 WHEEZING: ICD-10-CM

## 2025-04-21 RX ORDER — FLUTICASONE FUROATE AND VILANTEROL TRIFENATATE 100; 25 UG/1; UG/1
POWDER RESPIRATORY (INHALATION)
Qty: 60 EACH | Refills: 2 | Status: SHIPPED | OUTPATIENT
Start: 2025-04-21

## 2025-04-24 ENCOUNTER — OFFICE VISIT (OUTPATIENT)
Dept: FAMILY MEDICINE CLINIC | Age: 25
End: 2025-04-24
Payer: COMMERCIAL

## 2025-04-24 VITALS
HEART RATE: 75 BPM | WEIGHT: 272.8 LBS | TEMPERATURE: 96.8 F | HEIGHT: 66 IN | SYSTOLIC BLOOD PRESSURE: 108 MMHG | DIASTOLIC BLOOD PRESSURE: 64 MMHG | OXYGEN SATURATION: 96 % | BODY MASS INDEX: 43.84 KG/M2

## 2025-04-24 DIAGNOSIS — F33.2 SEVERE EPISODE OF RECURRENT MAJOR DEPRESSIVE DISORDER, WITHOUT PSYCHOTIC FEATURES (HCC): ICD-10-CM

## 2025-04-24 DIAGNOSIS — F41.9 ANXIETY: ICD-10-CM

## 2025-04-24 DIAGNOSIS — F50.89 BINGE-PURGE BEHAVIOR: ICD-10-CM

## 2025-04-24 DIAGNOSIS — F51.05 MOOD INSOMNIA: Primary | ICD-10-CM

## 2025-04-24 DIAGNOSIS — F51.05 MOOD INSOMNIA: ICD-10-CM

## 2025-04-24 DIAGNOSIS — F39 MOOD INSOMNIA: Primary | ICD-10-CM

## 2025-04-24 DIAGNOSIS — F39 MOOD INSOMNIA: ICD-10-CM

## 2025-04-24 LAB
ALBUMIN SERPL-MCNC: 4.5 G/DL (ref 3.4–5)
ALBUMIN/GLOB SERPL: 1.8 {RATIO} (ref 1.1–2.2)
ALP SERPL-CCNC: 71 U/L (ref 40–129)
ALT SERPL-CCNC: 31 U/L (ref 10–40)
ANION GAP SERPL CALCULATED.3IONS-SCNC: 12 MMOL/L (ref 3–16)
AST SERPL-CCNC: 26 U/L (ref 15–37)
BILIRUB SERPL-MCNC: <0.2 MG/DL (ref 0–1)
BUN SERPL-MCNC: 13 MG/DL (ref 7–20)
CALCIUM SERPL-MCNC: 9.5 MG/DL (ref 8.3–10.6)
CHLORIDE SERPL-SCNC: 105 MMOL/L (ref 99–110)
CO2 SERPL-SCNC: 24 MMOL/L (ref 21–32)
CREAT SERPL-MCNC: 0.8 MG/DL (ref 0.6–1.1)
DEPRECATED RDW RBC AUTO: 13.4 % (ref 12.4–15.4)
GFR SERPLBLD CREATININE-BSD FMLA CKD-EPI: >90 ML/MIN/{1.73_M2}
GLUCOSE SERPL-MCNC: 94 MG/DL (ref 70–99)
HCT VFR BLD AUTO: 38.2 % (ref 36–48)
HGB BLD-MCNC: 13.5 G/DL (ref 12–16)
MCH RBC QN AUTO: 30.7 PG (ref 26–34)
MCHC RBC AUTO-ENTMCNC: 35.3 G/DL (ref 31–36)
MCV RBC AUTO: 87 FL (ref 80–100)
PLATELET # BLD AUTO: 281 K/UL (ref 135–450)
PMV BLD AUTO: 8.9 FL (ref 5–10.5)
POTASSIUM SERPL-SCNC: 4.6 MMOL/L (ref 3.5–5.1)
PROT SERPL-MCNC: 7 G/DL (ref 6.4–8.2)
RBC # BLD AUTO: 4.38 M/UL (ref 4–5.2)
SODIUM SERPL-SCNC: 141 MMOL/L (ref 136–145)
TSH SERPL DL<=0.005 MIU/L-ACNC: 1.16 UIU/ML (ref 0.27–4.2)
WBC # BLD AUTO: 7.5 K/UL (ref 4–11)

## 2025-04-24 PROCEDURE — 99214 OFFICE O/P EST MOD 30 MIN: CPT | Performed by: PHYSICIAN ASSISTANT

## 2025-04-24 RX ORDER — HYDROXYZINE HYDROCHLORIDE 25 MG/1
25 TABLET, FILM COATED ORAL NIGHTLY
Qty: 30 TABLET | Refills: 2 | Status: SHIPPED | OUTPATIENT
Start: 2025-04-24 | End: 2025-05-24

## 2025-04-24 SDOH — ECONOMIC STABILITY: FOOD INSECURITY: WITHIN THE PAST 12 MONTHS, THE FOOD YOU BOUGHT JUST DIDN'T LAST AND YOU DIDN'T HAVE MONEY TO GET MORE.: NEVER TRUE

## 2025-04-24 SDOH — ECONOMIC STABILITY: FOOD INSECURITY: WITHIN THE PAST 12 MONTHS, YOU WORRIED THAT YOUR FOOD WOULD RUN OUT BEFORE YOU GOT MONEY TO BUY MORE.: NEVER TRUE

## 2025-04-24 ASSESSMENT — PATIENT HEALTH QUESTIONNAIRE - PHQ9
2. FEELING DOWN, DEPRESSED OR HOPELESS: NOT AT ALL
SUM OF ALL RESPONSES TO PHQ QUESTIONS 1-9: 5
10. IF YOU CHECKED OFF ANY PROBLEMS, HOW DIFFICULT HAVE THESE PROBLEMS MADE IT FOR YOU TO DO YOUR WORK, TAKE CARE OF THINGS AT HOME, OR GET ALONG WITH OTHER PEOPLE: SOMEWHAT DIFFICULT
9. THOUGHTS THAT YOU WOULD BE BETTER OFF DEAD, OR OF HURTING YOURSELF: NOT AT ALL
7. TROUBLE CONCENTRATING ON THINGS, SUCH AS READING THE NEWSPAPER OR WATCHING TELEVISION: SEVERAL DAYS
SUM OF ALL RESPONSES TO PHQ QUESTIONS 1-9: 5
8. MOVING OR SPEAKING SO SLOWLY THAT OTHER PEOPLE COULD HAVE NOTICED. OR THE OPPOSITE, BEING SO FIGETY OR RESTLESS THAT YOU HAVE BEEN MOVING AROUND A LOT MORE THAN USUAL: NOT AT ALL
1. LITTLE INTEREST OR PLEASURE IN DOING THINGS: NOT AT ALL
4. FEELING TIRED OR HAVING LITTLE ENERGY: SEVERAL DAYS
6. FEELING BAD ABOUT YOURSELF - OR THAT YOU ARE A FAILURE OR HAVE LET YOURSELF OR YOUR FAMILY DOWN: NOT AT ALL
SUM OF ALL RESPONSES TO PHQ QUESTIONS 1-9: 5
5. POOR APPETITE OR OVEREATING: NOT AT ALL
SUM OF ALL RESPONSES TO PHQ QUESTIONS 1-9: 5
3. TROUBLE FALLING OR STAYING ASLEEP: NEARLY EVERY DAY

## 2025-04-24 ASSESSMENT — ANXIETY QUESTIONNAIRES
2. NOT BEING ABLE TO STOP OR CONTROL WORRYING: NOT AT ALL
1. FEELING NERVOUS, ANXIOUS, OR ON EDGE: NOT AT ALL
6. BECOMING EASILY ANNOYED OR IRRITABLE: NOT AT ALL
4. TROUBLE RELAXING: NOT AT ALL
GAD7 TOTAL SCORE: 4
5. BEING SO RESTLESS THAT IT IS HARD TO SIT STILL: SEVERAL DAYS
3. WORRYING TOO MUCH ABOUT DIFFERENT THINGS: NOT AT ALL
IF YOU CHECKED OFF ANY PROBLEMS ON THIS QUESTIONNAIRE, HOW DIFFICULT HAVE THESE PROBLEMS MADE IT FOR YOU TO DO YOUR WORK, TAKE CARE OF THINGS AT HOME, OR GET ALONG WITH OTHER PEOPLE: NOT DIFFICULT AT ALL
7. FEELING AFRAID AS IF SOMETHING AWFUL MIGHT HAPPEN: NEARLY EVERY DAY

## 2025-04-24 ASSESSMENT — ENCOUNTER SYMPTOMS
COUGH: 0
SORE THROAT: 0
VOMITING: 0
RHINORRHEA: 0
NAUSEA: 0
ABDOMINAL PAIN: 0
CONSTIPATION: 0
DIARRHEA: 0
SHORTNESS OF BREATH: 0

## 2025-04-24 NOTE — PROGRESS NOTES
2025  Anu Mckeon (: 2000)  25 y.o.    ASSESSMENT and PLAN:  Anu was seen today for anxiety and depression.    Diagnoses and all orders for this visit:    Mood insomnia  -     TSH reflex to FT4; Future  -     hydrOXYzine HCl (ATARAX) 25 MG tablet; Take 1 tablet by mouth nightly  - chronic, worsening, trial hydroxyzine.     Severe episode of recurrent major depressive disorder, without psychotic features (HCC)  Anxiety  -     CBC; Future  -     Comprehensive Metabolic Panel; Future  - stable on current regimen, understands the importance of plugging in with psychiatry which is scheduled for next month. I will continue to prescribe in the interim. Update labs per orders.     Binge-purge behavior  -     CBC; Future  -     Comprehensive Metabolic Panel; Future  - needs psychology evaluation, patient has contact information for [facility], plans to schedule.       Return in about 3 months (around 2025) for Annual Exam.    Anxiety  Patient reports no chest pain, dizziness, nausea, palpitations or shortness of breath.       DepressionPatient is not experiencing: palpitations and shortness of breath.          Anxiety and depression: waiting for psychiatry appt next month. Completed IOP end of March. Stable on lamictal and prozac. Has psychology referral for therapist with [facility] [place]. Work is going well. Situation family issues somewhat improved. Sleeping ok- is a night owl and then has trouble getting to sleep.     Due for pap, labs, and vaccines- plans to discuss at upcoming physical.     Review of Systems   Constitutional:  Negative for activity change, chills and fever.   HENT:  Negative for congestion, ear pain, rhinorrhea and sore throat.    Eyes:  Negative for visual disturbance.   Respiratory:  Negative for cough and shortness of breath.    Cardiovascular:  Negative for chest pain and palpitations.   Gastrointestinal:  Negative for abdominal pain, constipation, diarrhea,

## 2025-04-25 ENCOUNTER — RESULTS FOLLOW-UP (OUTPATIENT)
Dept: FAMILY MEDICINE CLINIC | Age: 25
End: 2025-04-25

## 2025-04-29 NOTE — TELEPHONE ENCOUNTER
Refill Request     CONFIRM preferred pharmacy with the patient.    If Mail Order Rx - Pend for 90 day refill.      Last Seen: Last Seen Department: 4/24/2025  Last Seen by PCP: 4/24/2025    Last Written: 4/2/25 90 with 1 refill     If no future appointment scheduled:  Review the last OV with PCP and review information for follow-up visit,  Route STAFF MESSAGE with patient name to the  Pool for scheduling with the following information:            -  Timing of next visit           -  Visit type ie Physical, OV, etc           -  Diagnoses/Reason ie. COPD, HTN - Do not use MEDICATION, Follow-up or CHECK UP - Give reason for visit      Next Appointment:   No future appointments.    Message sent to  to schedule appt with patient?  NO      Requested Prescriptions     Pending Prescriptions Disp Refills    lamoTRIgine (LAMICTAL) 200 MG tablet 90 tablet 1     Sig: Take 1 tablet by mouth daily

## 2025-04-30 RX ORDER — LAMOTRIGINE 200 MG/1
200 TABLET ORAL DAILY
Qty: 90 TABLET | Refills: 1 | Status: SHIPPED | OUTPATIENT
Start: 2025-04-30

## 2025-04-30 NOTE — TELEPHONE ENCOUNTER
Refill Request     CONFIRM preferred pharmacy with the patient.    If Mail Order Rx - Pend for 90 day refill.      Last Seen: Last Seen Department: 1/30/2025  Last Seen by PCP: 1/30/2025    Last Written: 1/8/2025 60 each 2 refills    If no future appointment scheduled:  Review the last OV with PCP and review information for follow-up visit,  Route STAFF MESSAGE with patient name to the  Pool for scheduling with the following information:            -  Timing of next visit           -  Visit type ie Physical, OV, etc           -  Diagnoses/Reason ie. COPD, HTN - Do not use MEDICATION, Follow-up or CHECK UP - Give reason for visit      Next Appointment:   No future appointments.    Message sent to  to schedule appt with patient?  YES    Return in about 4 days (around 2/3/2025).     Requested Prescriptions     Pending Prescriptions Disp Refills    BREO ELLIPTA 100-25 MCG/ACT inhaler [Pharmacy Med Name: BREO ELLIPTA 100-25 MCG INHALR] 60 each 2     Sig: TAKE 1 PUFF BY MOUTH EVERY DAY        57yM w/no stated pmhx presenting to the ED with diarrhea, leg cramps chest pain and shortness of breath x 4 days. Pt reports he has had multiple episodes of diarrhea but unable to tolerate PO, has nausea but no vomiting. Pt reports leg cramping, at times with abdominal cramping. States chest pain is midsternal, intermittent, non radiating, not exertional. Pt also endorses shortness of breath which is intermittent, not exertional. Pt denies fever/chills, flank pain, back pain, leg swelling, urinary complaints, headache, dizziness, recent travel or illness or any other concerns.  In main ED cbc/cmp with Cr 2.71, rpt s/p IV fluids, 2.95, trop 10-->12, EKG non ischemic.  Sent to CDU for renal consult, if Cr not improving, am labs, echo, stress test, tele doc evaluation

## 2025-05-16 DIAGNOSIS — F51.05 MOOD INSOMNIA: ICD-10-CM

## 2025-05-16 DIAGNOSIS — F39 MOOD INSOMNIA: ICD-10-CM

## 2025-05-16 RX ORDER — HYDROXYZINE HYDROCHLORIDE 25 MG/1
25 TABLET, FILM COATED ORAL NIGHTLY
Qty: 90 TABLET | Refills: 1 | Status: SHIPPED | OUTPATIENT
Start: 2025-05-16

## 2025-05-16 NOTE — TELEPHONE ENCOUNTER
Refill Request     CONFIRM preferred pharmacy with the patient.    If Mail Order Rx - Pend for 90 day refill.      Last Seen: Last Seen Department: 4/24/2025  Last Seen by PCP: 4/24/2025    Last Written: 04/24/25 30 with 2 refills    If no future appointment scheduled:  Review the last OV with PCP and review information for follow-up visit,  Route STAFF MESSAGE with patient name to the  Pool for scheduling with the following information:            -  Timing of next visit           -  Visit type ie Physical, OV, etc           -  Diagnoses/Reason ie. COPD, HTN - Do not use MEDICATION, Follow-up or CHECK UP - Give reason for visit      Next Appointment:   No future appointments.    Message sent to  to schedule appt with patient?  NO      Requested Prescriptions     Pending Prescriptions Disp Refills    hydrOXYzine HCl (ATARAX) 25 MG tablet [Pharmacy Med Name: HYDROXYZINE HCL 25 MG TABLET] 90 tablet 1     Sig: TAKE 1 TABLET BY MOUTH EVERY DAY AT NIGHT

## 2025-05-30 ENCOUNTER — OFFICE VISIT (OUTPATIENT)
Age: 25
End: 2025-05-30

## 2025-05-30 VITALS
OXYGEN SATURATION: 98 % | SYSTOLIC BLOOD PRESSURE: 126 MMHG | TEMPERATURE: 96.8 F | DIASTOLIC BLOOD PRESSURE: 78 MMHG | HEART RATE: 79 BPM

## 2025-05-30 DIAGNOSIS — Z20.2 POSSIBLE EXPOSURE TO STD: ICD-10-CM

## 2025-05-30 DIAGNOSIS — L03.115 CELLULITIS OF RIGHT THIGH: Primary | ICD-10-CM

## 2025-05-30 DIAGNOSIS — R21 RASH: ICD-10-CM

## 2025-05-30 DIAGNOSIS — N90.7 LABIAL CYST: ICD-10-CM

## 2025-05-30 RX ORDER — CEPHALEXIN 500 MG/1
500 CAPSULE ORAL 4 TIMES DAILY
Qty: 28 CAPSULE | Refills: 0 | Status: SHIPPED | OUTPATIENT
Start: 2025-05-30 | End: 2025-06-06

## 2025-05-30 NOTE — PROGRESS NOTES
Anu Mckeon (:  2000) is a 25 y.o. female,  here for evaluation of the following chief complaint(s): Cyst (Noticed on inner labia, is susceptible to cyst, has a new sex partner. Did not use condom.) and Rash (Spots are also on neck )    Anu Mckeon is a: New patient.   Last Urgent Care Visit: Visit date not found  I have reviewed the patient's medications and basic medical history; see Medication Reconciliation.    ASSESSMENT/PLAN:  Diagnosis:     ICD-10-CM    1. Cellulitis of right thigh  L03.115 cephALEXin (KEFLEX) 500 MG capsule      2. Possible exposure to STD  Z20.2 VAGINITIS PANEL PCR     C.trachomatis N.gonorrhoeae DNA, Urine      3. Labial cyst  N90.7       4. Rash  R21              Medical Decision Making:   Patient was seen at Urgent Care today for multiple needs including cellulitis of the right thigh, right labial cyst, pruritic rash to the neck as well as possible STD exposure.  Exam was performed with female MA chaperone after patient verbal consent.    Cellulitis of right thigh:  Has been progressively enlarging over the last 3 days.  On exam there is approximately 3 cm diam round area of erythema.  There is a small central papule but no fluctuance.  No indication for I&D at this time.  Patient is advised warm compresses and is prescribed Keflex for antibiotic coverage  She will closely monitor and return or go to ER if worse.    Rash on neck:  Folliculitis versus allergic reaction.  This does appear more hive-like and particularly in the picture she shows me from yesterday this appeared to be more consistent with urticaria.  I suspect this to be more allergic.  It is significantly improved from picture she shows me from yesterday.  She is advised oral antihistamine and may apply topical Benadryl as well.  The prescribed Keflex for the cellulitis of the thigh should provide antibiotic coverage if there is any folliculitis component.  Follow-up or return if not improved.    Labial cyst  This is

## 2025-05-30 NOTE — PATIENT INSTRUCTIONS
Cellulitis of Right Thigh:   Take antibiotic as prescribed:    You do not need to apply any antibiotic ointment.     Redness and swelling may slightly enlarge over the next 24-36 hours but then should stabilize and begin to regress.     Return if there is lack of improvement or worsening despite at 3 days of antibiotics.     Also return or go to the ER if there is significant, rapid increasing redness and swelling and/or onset of fever over the next 24 hours.     Rash:   Continue over the counter antihistamine such as Zyrtec or Claritin.     May do topical benadryl cream.     Return if worse.     Labia Cyst:   This appears fairly minimal.     Can do warm compresses.     Follow-up with GYN if not improved.     STD Testing:   Results are typically 48 hours.     They are available in [facility]. We will also call you.

## 2025-05-31 LAB
BV BACTERIA DNA VAG QL NAA+PROBE: NOT DETECTED
C GLABRATA DNA VAG QL NAA+PROBE: NORMAL
C GLABRATA DNA VAG QL NAA+PROBE: NOT DETECTED
C KRUSEI DNA VAG QL NAA+PROBE: NOT DETECTED
CANDIDA DNA VAG QL NAA+PROBE: NOT DETECTED
T VAGINALIS DNA VAG QL NAA+PROBE: NOT DETECTED

## 2025-06-01 ENCOUNTER — RESULTS FOLLOW-UP (OUTPATIENT)
Age: 25
End: 2025-06-01

## 2025-06-02 LAB
C TRACH DNA UR QL NAA+PROBE: NEGATIVE
N GONORRHOEA DNA UR QL NAA+PROBE: NEGATIVE

## 2025-06-20 DIAGNOSIS — F51.05 MOOD INSOMNIA: ICD-10-CM

## 2025-06-20 DIAGNOSIS — F39 MOOD INSOMNIA: ICD-10-CM

## 2025-06-20 RX ORDER — HYDROXYZINE HYDROCHLORIDE 25 MG/1
TABLET, FILM COATED ORAL
Qty: 180 TABLET | Refills: 1 | Status: SHIPPED | OUTPATIENT
Start: 2025-06-20

## 2025-07-25 DIAGNOSIS — R06.2 WHEEZING: ICD-10-CM

## 2025-07-25 DIAGNOSIS — J45.30 MILD PERSISTENT ASTHMA WITHOUT COMPLICATION: ICD-10-CM

## 2025-07-25 RX ORDER — FLUTICASONE FUROATE AND VILANTEROL TRIFENATATE 100; 25 UG/1; UG/1
POWDER RESPIRATORY (INHALATION)
Qty: 60 EACH | Refills: 2 | Status: SHIPPED | OUTPATIENT
Start: 2025-07-25

## 2025-07-25 NOTE — TELEPHONE ENCOUNTER
Refill Request     CONFIRM preferred pharmacy with the patient.    If Mail Order Rx - Pend for 90 day refill.      Last Seen: Last Seen Department: 4/24/2025  Last Seen by PCP: 4/24/2025    Last Written: 4/21/25 60 with 2 refills     If no future appointment scheduled:  Review the last OV with PCP and review information for follow-up visit,  Route STAFF MESSAGE with patient name to the  Pool for scheduling with the following information:            -  Timing of next visit           -  Visit type ie Physical, OV, etc           -  Diagnoses/Reason ie. COPD, HTN - Do not use MEDICATION, Follow-up or CHECK UP - Give reason for visit      Next Appointment:   No future appointments.    Message sent to  to schedule appt with patient?  YES was to Return in about 3 months (around 7/24/2025) for Annual Exam.       Requested Prescriptions     Pending Prescriptions Disp Refills    BREO ELLIPTA 100-25 MCG/ACT inhaler [Pharmacy Med Name: BREO ELLIPTA 100-25 MCG INHALR] 60 each 2     Sig: TAKE 1 PUFF BY MOUTH EVERY DAY

## 2025-07-31 ENCOUNTER — E-VISIT (OUTPATIENT)
Dept: FAMILY MEDICINE CLINIC | Age: 25
End: 2025-07-31
Payer: COMMERCIAL

## 2025-07-31 ENCOUNTER — PATIENT MESSAGE (OUTPATIENT)
Dept: FAMILY MEDICINE CLINIC | Age: 25
End: 2025-07-31

## 2025-07-31 DIAGNOSIS — H10.029 PINK EYE DISEASE, UNSPECIFIED LATERALITY: Primary | ICD-10-CM

## 2025-07-31 DIAGNOSIS — H10.9 BACTERIAL CONJUNCTIVITIS: Primary | ICD-10-CM

## 2025-07-31 PROCEDURE — 99421 OL DIG E/M SVC 5-10 MIN: CPT | Performed by: PHYSICIAN ASSISTANT

## 2025-07-31 RX ORDER — POLYMYXIN B SULFATE AND TRIMETHOPRIM 1; 10000 MG/ML; [USP'U]/ML
1 SOLUTION OPHTHALMIC EVERY 4 HOURS
Qty: 3 ML | Refills: 0 | Status: SHIPPED | OUTPATIENT
Start: 2025-07-31 | End: 2025-08-10

## 2025-07-31 NOTE — PROGRESS NOTES
Questionnaire reviewed.   Will treat as bacterial conjunctivitis   Non contact lens wearer- no vision changes.   Hygiene discussed given contagious nature   Reviewed red flag sxs that would warrant additional evaluation.     5-10 minutes were spent on the digital evaluation and management of this patient.

## 2025-08-08 DIAGNOSIS — F39 MOOD INSOMNIA: ICD-10-CM

## 2025-08-08 DIAGNOSIS — F51.05 MOOD INSOMNIA: ICD-10-CM

## 2025-08-08 RX ORDER — HYDROXYZINE HYDROCHLORIDE 25 MG/1
TABLET, FILM COATED ORAL
Qty: 180 TABLET | Refills: 1 | Status: SHIPPED | OUTPATIENT
Start: 2025-08-08